# Patient Record
Sex: MALE | Race: WHITE | NOT HISPANIC OR LATINO | Employment: OTHER | URBAN - METROPOLITAN AREA
[De-identification: names, ages, dates, MRNs, and addresses within clinical notes are randomized per-mention and may not be internally consistent; named-entity substitution may affect disease eponyms.]

---

## 2023-01-01 ENCOUNTER — MEDICAL CORRESPONDENCE (OUTPATIENT)
Dept: HEALTH INFORMATION MANAGEMENT | Facility: CLINIC | Age: 73
End: 2023-01-01

## 2024-01-01 ENCOUNTER — TELEPHONE (OUTPATIENT)
Dept: NEUROSURGERY | Facility: CLINIC | Age: 74
End: 2024-01-01
Payer: OTHER MISCELLANEOUS

## 2024-01-01 ENCOUNTER — APPOINTMENT (OUTPATIENT)
Dept: OCCUPATIONAL THERAPY | Facility: HOSPITAL | Age: 74
DRG: 551 | End: 2024-01-01
Payer: COMMERCIAL

## 2024-01-01 ENCOUNTER — APPOINTMENT (OUTPATIENT)
Dept: CT IMAGING | Facility: HOSPITAL | Age: 74
DRG: 551 | End: 2024-01-01
Attending: EMERGENCY MEDICINE
Payer: COMMERCIAL

## 2024-01-01 ENCOUNTER — APPOINTMENT (OUTPATIENT)
Dept: INTERVENTIONAL RADIOLOGY/VASCULAR | Facility: HOSPITAL | Age: 74
DRG: 551 | End: 2024-01-01
Attending: NURSE PRACTITIONER
Payer: COMMERCIAL

## 2024-01-01 ENCOUNTER — HOSPITAL ENCOUNTER (INPATIENT)
Facility: HOSPITAL | Age: 74
LOS: 3 days | Discharge: HOSPICE | DRG: 551 | End: 2024-01-19
Attending: EMERGENCY MEDICINE | Admitting: FAMILY MEDICINE
Payer: COMMERCIAL

## 2024-01-01 ENCOUNTER — APPOINTMENT (OUTPATIENT)
Dept: PHYSICAL THERAPY | Facility: HOSPITAL | Age: 74
DRG: 551 | End: 2024-01-01
Payer: COMMERCIAL

## 2024-01-01 ENCOUNTER — HOSPITAL ENCOUNTER (OUTPATIENT)
Facility: HOSPITAL | Age: 74
Setting detail: OBSERVATION
Discharge: HOME | DRG: 551 | End: 2024-01-16
Attending: EMERGENCY MEDICINE | Admitting: RADIOLOGY
Payer: COMMERCIAL

## 2024-01-01 ENCOUNTER — MEDICAL CORRESPONDENCE (OUTPATIENT)
Dept: HEALTH INFORMATION MANAGEMENT | Facility: CLINIC | Age: 74
End: 2024-01-01
Payer: OTHER MISCELLANEOUS

## 2024-01-01 ENCOUNTER — APPOINTMENT (OUTPATIENT)
Dept: CT IMAGING | Facility: HOSPITAL | Age: 74
DRG: 551 | End: 2024-01-01
Payer: COMMERCIAL

## 2024-01-01 ENCOUNTER — APPOINTMENT (OUTPATIENT)
Dept: CT IMAGING | Facility: HOSPITAL | Age: 74
DRG: 551 | End: 2024-01-01
Attending: NURSE PRACTITIONER
Payer: COMMERCIAL

## 2024-01-01 VITALS
TEMPERATURE: 97.9 F | HEART RATE: 93 BPM | HEIGHT: 69 IN | RESPIRATION RATE: 19 BRPM | SYSTOLIC BLOOD PRESSURE: 135 MMHG | DIASTOLIC BLOOD PRESSURE: 83 MMHG | OXYGEN SATURATION: 98 % | BODY MASS INDEX: 20.38 KG/M2 | WEIGHT: 137.57 LBS

## 2024-01-01 VITALS
TEMPERATURE: 97.4 F | SYSTOLIC BLOOD PRESSURE: 135 MMHG | OXYGEN SATURATION: 100 % | HEART RATE: 80 BPM | RESPIRATION RATE: 18 BRPM | DIASTOLIC BLOOD PRESSURE: 81 MMHG

## 2024-01-01 DIAGNOSIS — S32.059A CLOSED FRACTURE OF FIFTH LUMBAR VERTEBRA, UNSPECIFIED FRACTURE MORPHOLOGY, INITIAL ENCOUNTER (H): ICD-10-CM

## 2024-01-01 DIAGNOSIS — I48.91 ATRIAL FIBRILLATION WITH RAPID VENTRICULAR RESPONSE (H): ICD-10-CM

## 2024-01-01 DIAGNOSIS — Z51.5 HOSPICE CARE PATIENT: ICD-10-CM

## 2024-01-01 DIAGNOSIS — S06.5XAA SDH (SUBDURAL HEMATOMA) (H): Primary | ICD-10-CM
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
## 2024-01-01 DIAGNOSIS — N30.00 ACUTE CYSTITIS WITHOUT HEMATURIA: ICD-10-CM

## 2024-01-01 DIAGNOSIS — S09.90XA TRAUMATIC INJURY OF HEAD, INITIAL ENCOUNTER: ICD-10-CM

## 2024-01-01 DIAGNOSIS — Z86.59 HISTORY OF DEMENTIA: ICD-10-CM
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
## 2024-01-01 DIAGNOSIS — S01.01XA SCALP LACERATION, INITIAL ENCOUNTER: ICD-10-CM
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
## 2024-01-01 DIAGNOSIS — S06.5XAA SUBDURAL HEMATOMA (H): Primary | ICD-10-CM

## 2024-01-01 DIAGNOSIS — I26.99 PULMONARY EMBOLISM, UNSPECIFIED CHRONICITY, UNSPECIFIED PULMONARY EMBOLISM TYPE, UNSPECIFIED WHETHER ACUTE COR PULMONALE PRESENT (H): ICD-10-CM

## 2024-01-01 DIAGNOSIS — S06.5XAA SUBDURAL HEMATOMA (H): ICD-10-CM

## 2024-01-01 LAB
ALBUMIN SERPL BCG-MCNC: 3.2 G/DL (ref 3.5–5.2)
ALBUMIN UR-MCNC: 20 MG/DL
ALBUMIN UR-MCNC: NEGATIVE MG/DL
ALP SERPL-CCNC: 66 U/L (ref 40–150)
ALT SERPL W P-5'-P-CCNC: 15 U/L (ref 0–70)
ANION GAP SERPL CALCULATED.3IONS-SCNC: 10 MMOL/L (ref 7–15)
ANION GAP SERPL CALCULATED.3IONS-SCNC: 10 MMOL/L (ref 7–15)
ANION GAP SERPL CALCULATED.3IONS-SCNC: 11 MMOL/L (ref 7–15)
ANION GAP SERPL CALCULATED.3IONS-SCNC: 4 MMOL/L (ref 7–15)
ANION GAP SERPL CALCULATED.3IONS-SCNC: 7 MMOL/L (ref 7–15)
ANION GAP SERPL CALCULATED.3IONS-SCNC: 8 MMOL/L (ref 7–15)
ANION GAP SERPL CALCULATED.3IONS-SCNC: 8 MMOL/L (ref 7–15)
ANION GAP SERPL CALCULATED.3IONS-SCNC: 9 MMOL/L (ref 7–15)
APPEARANCE UR: ABNORMAL
APPEARANCE UR: ABNORMAL
APTT PPP: 31 SECONDS (ref 22–38)
AST SERPL W P-5'-P-CCNC: 14 U/L (ref 0–45)
ATRIAL RATE - MUSE: 129 BPM
BACTERIA #/AREA URNS HPF: ABNORMAL /HPF
BACTERIA #/AREA URNS HPF: ABNORMAL /HPF
BACTERIA UR CULT: ABNORMAL
BASOPHILS # BLD AUTO: 0 10E3/UL (ref 0–0.2)
BASOPHILS NFR BLD AUTO: 0 %
BILIRUB SERPL-MCNC: 2.1 MG/DL
BILIRUB UR QL STRIP: NEGATIVE
BILIRUB UR QL STRIP: NEGATIVE
BUN SERPL-MCNC: 11.5 MG/DL (ref 8–23)
BUN SERPL-MCNC: 15.5 MG/DL (ref 8–23)
BUN SERPL-MCNC: 15.9 MG/DL (ref 8–23)
BUN SERPL-MCNC: 16.2 MG/DL (ref 8–23)
BUN SERPL-MCNC: 16.8 MG/DL (ref 8–23)
BUN SERPL-MCNC: 17.6 MG/DL (ref 8–23)
BUN SERPL-MCNC: 19.1 MG/DL (ref 8–23)
BUN SERPL-MCNC: 19.9 MG/DL (ref 8–23)
BUN SERPL-MCNC: 25.3 MG/DL (ref 8–23)
BUN SERPL-MCNC: 37.6 MG/DL (ref 8–23)
CALCIUM SERPL-MCNC: 8.5 MG/DL (ref 8.8–10.2)
CALCIUM SERPL-MCNC: 8.6 MG/DL (ref 8.8–10.2)
CALCIUM SERPL-MCNC: 8.6 MG/DL (ref 8.8–10.2)
CALCIUM SERPL-MCNC: 8.7 MG/DL (ref 8.8–10.2)
CALCIUM SERPL-MCNC: 8.9 MG/DL (ref 8.8–10.2)
CALCIUM SERPL-MCNC: 9 MG/DL (ref 8.8–10.2)
CALCIUM SERPL-MCNC: 9.2 MG/DL (ref 8.8–10.2)
CALCIUM SERPL-MCNC: 9.2 MG/DL (ref 8.8–10.2)
CALCIUM SERPL-MCNC: 9.3 MG/DL (ref 8.8–10.2)
CALCIUM SERPL-MCNC: 9.3 MG/DL (ref 8.8–10.2)
CHLORIDE SERPL-SCNC: 101 MMOL/L (ref 98–107)
CHLORIDE SERPL-SCNC: 103 MMOL/L (ref 98–107)
CHLORIDE SERPL-SCNC: 107 MMOL/L (ref 98–107)
CHLORIDE SERPL-SCNC: 109 MMOL/L (ref 98–107)
CHLORIDE SERPL-SCNC: 109 MMOL/L (ref 98–107)
CHLORIDE SERPL-SCNC: 111 MMOL/L (ref 98–107)
CHLORIDE SERPL-SCNC: 111 MMOL/L (ref 98–107)
CK SERPL-CCNC: 26 U/L (ref 39–308)
COLOR UR AUTO: YELLOW
COLOR UR AUTO: YELLOW
CREAT SERPL-MCNC: 0.63 MG/DL (ref 0.67–1.17)
CREAT SERPL-MCNC: 0.69 MG/DL (ref 0.67–1.17)
CREAT SERPL-MCNC: 0.7 MG/DL (ref 0.67–1.17)
CREAT SERPL-MCNC: 0.75 MG/DL (ref 0.67–1.17)
CREAT SERPL-MCNC: 0.84 MG/DL (ref 0.67–1.17)
CREAT SERPL-MCNC: 0.86 MG/DL (ref 0.67–1.17)
CREAT SERPL-MCNC: 0.9 MG/DL (ref 0.67–1.17)
CREAT SERPL-MCNC: 0.92 MG/DL (ref 0.67–1.17)
CREAT SERPL-MCNC: 0.92 MG/DL (ref 0.67–1.17)
CREAT SERPL-MCNC: 0.98 MG/DL (ref 0.67–1.17)
D DIMER PPP FEU-MCNC: 3.45 UG/ML FEU (ref 0–0.5)
DEPRECATED HCO3 PLAS-SCNC: 23 MMOL/L (ref 22–29)
DEPRECATED HCO3 PLAS-SCNC: 24 MMOL/L (ref 22–29)
DEPRECATED HCO3 PLAS-SCNC: 25 MMOL/L (ref 22–29)
DEPRECATED HCO3 PLAS-SCNC: 26 MMOL/L (ref 22–29)
DEPRECATED HCO3 PLAS-SCNC: 27 MMOL/L (ref 22–29)
DEPRECATED HCO3 PLAS-SCNC: 28 MMOL/L (ref 22–29)
DIASTOLIC BLOOD PRESSURE - MUSE: NORMAL MMHG
DIGOXIN SERPL-MCNC: 0.7 NG/ML (ref 0.6–2)
DIGOXIN SERPL-MCNC: <0.4 NG/ML (ref 0.6–2)
EGFRCR SERPLBLD CKD-EPI 2021: 80 ML/MIN/1.73M2
EGFRCR SERPLBLD CKD-EPI 2021: 87 ML/MIN/1.73M2
EGFRCR SERPLBLD CKD-EPI 2021: 87 ML/MIN/1.73M2
EGFRCR SERPLBLD CKD-EPI 2021: 89 ML/MIN/1.73M2
EGFRCR SERPLBLD CKD-EPI 2021: 90 ML/MIN/1.73M2
EGFRCR SERPLBLD CKD-EPI 2021: >90 ML/MIN/1.73M2
EOSINOPHIL # BLD AUTO: 0 10E3/UL (ref 0–0.7)
EOSINOPHIL NFR BLD AUTO: 0 %
ERYTHROCYTE [DISTWIDTH] IN BLOOD BY AUTOMATED COUNT: 15 % (ref 10–15)
ERYTHROCYTE [DISTWIDTH] IN BLOOD BY AUTOMATED COUNT: 15 % (ref 10–15)
ERYTHROCYTE [DISTWIDTH] IN BLOOD BY AUTOMATED COUNT: 15.1 % (ref 10–15)
ERYTHROCYTE [DISTWIDTH] IN BLOOD BY AUTOMATED COUNT: 15.2 % (ref 10–15)
ERYTHROCYTE [DISTWIDTH] IN BLOOD BY AUTOMATED COUNT: 15.3 % (ref 10–15)
ERYTHROCYTE [DISTWIDTH] IN BLOOD BY AUTOMATED COUNT: 15.4 % (ref 10–15)
ERYTHROCYTE [DISTWIDTH] IN BLOOD BY AUTOMATED COUNT: 15.5 % (ref 10–15)
ERYTHROCYTE [DISTWIDTH] IN BLOOD BY AUTOMATED COUNT: 15.6 % (ref 10–15)
GLUCOSE BLDC GLUCOMTR-MCNC: 104 MG/DL (ref 70–99)
GLUCOSE BLDC GLUCOMTR-MCNC: 150 MG/DL (ref 70–99)
GLUCOSE BLDC GLUCOMTR-MCNC: 89 MG/DL (ref 70–99)
GLUCOSE SERPL-MCNC: 105 MG/DL (ref 70–99)
GLUCOSE SERPL-MCNC: 105 MG/DL (ref 70–99)
GLUCOSE SERPL-MCNC: 121 MG/DL (ref 70–99)
GLUCOSE SERPL-MCNC: 121 MG/DL (ref 70–99)
GLUCOSE SERPL-MCNC: 161 MG/DL (ref 70–99)
GLUCOSE SERPL-MCNC: 85 MG/DL (ref 70–99)
GLUCOSE SERPL-MCNC: 90 MG/DL (ref 70–99)
GLUCOSE SERPL-MCNC: 93 MG/DL (ref 70–99)
GLUCOSE SERPL-MCNC: 94 MG/DL (ref 70–99)
GLUCOSE SERPL-MCNC: 97 MG/DL (ref 70–99)
GLUCOSE UR STRIP-MCNC: NEGATIVE MG/DL
GLUCOSE UR STRIP-MCNC: NEGATIVE MG/DL
HCT VFR BLD AUTO: 39.4 % (ref 40–53)
HCT VFR BLD AUTO: 39.7 % (ref 40–53)
HCT VFR BLD AUTO: 40.1 % (ref 40–53)
HCT VFR BLD AUTO: 40.4 % (ref 40–53)
HCT VFR BLD AUTO: 41.3 % (ref 40–53)
HCT VFR BLD AUTO: 41.7 % (ref 40–53)
HCT VFR BLD AUTO: 41.7 % (ref 40–53)
HCT VFR BLD AUTO: 42.8 % (ref 40–53)
HCT VFR BLD AUTO: 44.8 % (ref 40–53)
HCT VFR BLD AUTO: 45.7 % (ref 40–53)
HGB BLD-MCNC: 12.5 G/DL (ref 13.3–17.7)
HGB BLD-MCNC: 13.1 G/DL (ref 13.3–17.7)
HGB BLD-MCNC: 13.1 G/DL (ref 13.3–17.7)
HGB BLD-MCNC: 13.4 G/DL (ref 13.3–17.7)
HGB BLD-MCNC: 13.4 G/DL (ref 13.3–17.7)
HGB BLD-MCNC: 13.7 G/DL (ref 13.3–17.7)
HGB BLD-MCNC: 14.4 G/DL (ref 13.3–17.7)
HGB BLD-MCNC: 14.6 G/DL (ref 13.3–17.7)
HGB UR QL STRIP: NEGATIVE
HGB UR QL STRIP: NEGATIVE
HOLD SPECIMEN: NORMAL
IMM GRANULOCYTES # BLD: 0 10E3/UL
IMM GRANULOCYTES NFR BLD: 0 %
INR PPP: 1.06 (ref 0.85–1.15)
INR PPP: 1.62 (ref 0.85–1.15)
INTERPRETATION ECG - MUSE: NORMAL
KETONES UR STRIP-MCNC: ABNORMAL MG/DL
KETONES UR STRIP-MCNC: NEGATIVE MG/DL
LEUKOCYTE ESTERASE UR QL STRIP: ABNORMAL
LEUKOCYTE ESTERASE UR QL STRIP: ABNORMAL
LYMPHOCYTES # BLD AUTO: 1 10E3/UL (ref 0.8–5.3)
LYMPHOCYTES NFR BLD AUTO: 10 %
MAGNESIUM SERPL-MCNC: 1.9 MG/DL (ref 1.7–2.3)
MCH RBC QN AUTO: 31.3 PG (ref 26.5–33)
MCH RBC QN AUTO: 31.4 PG (ref 26.5–33)
MCH RBC QN AUTO: 31.4 PG (ref 26.5–33)
MCH RBC QN AUTO: 31.5 PG (ref 26.5–33)
MCH RBC QN AUTO: 31.5 PG (ref 26.5–33)
MCH RBC QN AUTO: 31.6 PG (ref 26.5–33)
MCH RBC QN AUTO: 31.6 PG (ref 26.5–33)
MCH RBC QN AUTO: 31.9 PG (ref 26.5–33)
MCH RBC QN AUTO: 32 PG (ref 26.5–33)
MCH RBC QN AUTO: 32.2 PG (ref 26.5–33)
MCHC RBC AUTO-ENTMCNC: 30.9 G/DL (ref 31.5–36.5)
MCHC RBC AUTO-ENTMCNC: 31.5 G/DL (ref 31.5–36.5)
MCHC RBC AUTO-ENTMCNC: 31.7 G/DL (ref 31.5–36.5)
MCHC RBC AUTO-ENTMCNC: 31.7 G/DL (ref 31.5–36.5)
MCHC RBC AUTO-ENTMCNC: 31.9 G/DL (ref 31.5–36.5)
MCHC RBC AUTO-ENTMCNC: 32 G/DL (ref 31.5–36.5)
MCHC RBC AUTO-ENTMCNC: 32.1 G/DL (ref 31.5–36.5)
MCHC RBC AUTO-ENTMCNC: 32.7 G/DL (ref 31.5–36.5)
MCV RBC AUTO: 100 FL (ref 78–100)
MCV RBC AUTO: 101 FL (ref 78–100)
MCV RBC AUTO: 98 FL (ref 78–100)
MCV RBC AUTO: 98 FL (ref 78–100)
MCV RBC AUTO: 99 FL (ref 78–100)
MONOCYTES # BLD AUTO: 0.7 10E3/UL (ref 0–1.3)
MONOCYTES NFR BLD AUTO: 6 %
MUCOUS THREADS #/AREA URNS LPF: PRESENT /LPF
NEUTROPHILS # BLD AUTO: 8.7 10E3/UL (ref 1.6–8.3)
NEUTROPHILS NFR BLD AUTO: 84 %
NITRATE UR QL: POSITIVE
NITRATE UR QL: POSITIVE
NRBC # BLD AUTO: 0 10E3/UL
NRBC BLD AUTO-RTO: 0 /100
NT-PROBNP SERPL-MCNC: 2010 PG/ML (ref 0–900)
P AXIS - MUSE: NORMAL DEGREES
PH UR STRIP: 5.5 [PH] (ref 5–7)
PH UR STRIP: 6 [PH] (ref 5–7)
PLATELET # BLD AUTO: 149 10E3/UL (ref 150–450)
PLATELET # BLD AUTO: 174 10E3/UL (ref 150–450)
PLATELET # BLD AUTO: 174 10E3/UL (ref 150–450)
PLATELET # BLD AUTO: 182 10E3/UL (ref 150–450)
PLATELET # BLD AUTO: 193 10E3/UL (ref 150–450)
PLATELET # BLD AUTO: 194 10E3/UL (ref 150–450)
PLATELET # BLD AUTO: 194 10E3/UL (ref 150–450)
PLATELET # BLD AUTO: 201 10E3/UL (ref 150–450)
PLATELET # BLD AUTO: 203 10E3/UL (ref 150–450)
PLATELET # BLD AUTO: 232 10E3/UL (ref 150–450)
POTASSIUM SERPL-SCNC: 3.8 MMOL/L (ref 3.4–5.3)
POTASSIUM SERPL-SCNC: 3.9 MMOL/L (ref 3.4–5.3)
POTASSIUM SERPL-SCNC: 4.1 MMOL/L (ref 3.4–5.3)
POTASSIUM SERPL-SCNC: 4.2 MMOL/L (ref 3.4–5.3)
POTASSIUM SERPL-SCNC: 4.3 MMOL/L (ref 3.4–5.3)
POTASSIUM SERPL-SCNC: 4.3 MMOL/L (ref 3.4–5.3)
POTASSIUM SERPL-SCNC: 4.4 MMOL/L (ref 3.4–5.3)
PR INTERVAL - MUSE: NORMAL MS
PROT SERPL-MCNC: 6.3 G/DL (ref 6.4–8.3)
QRS DURATION - MUSE: 84 MS
QT - MUSE: 348 MS
QTC - MUSE: 444 MS
R AXIS - MUSE: 2 DEGREES
RADIOLOGIST FLAGS: ABNORMAL
RBC # BLD AUTO: 3.96 10E6/UL (ref 4.4–5.9)
RBC # BLD AUTO: 3.96 10E6/UL (ref 4.4–5.9)
RBC # BLD AUTO: 4 10E6/UL (ref 4.4–5.9)
RBC # BLD AUTO: 4.07 10E6/UL (ref 4.4–5.9)
RBC # BLD AUTO: 4.17 10E6/UL (ref 4.4–5.9)
RBC # BLD AUTO: 4.2 10E6/UL (ref 4.4–5.9)
RBC # BLD AUTO: 4.25 10E6/UL (ref 4.4–5.9)
RBC # BLD AUTO: 4.36 10E6/UL (ref 4.4–5.9)
RBC # BLD AUTO: 4.5 10E6/UL (ref 4.4–5.9)
RBC # BLD AUTO: 4.64 10E6/UL (ref 4.4–5.9)
RBC URINE: 3 /HPF
RBC URINE: 5 /HPF
SODIUM SERPL-SCNC: 137 MMOL/L (ref 135–145)
SODIUM SERPL-SCNC: 137 MMOL/L (ref 135–145)
SODIUM SERPL-SCNC: 139 MMOL/L (ref 135–145)
SODIUM SERPL-SCNC: 140 MMOL/L (ref 135–145)
SODIUM SERPL-SCNC: 140 MMOL/L (ref 135–145)
SODIUM SERPL-SCNC: 141 MMOL/L (ref 135–145)
SODIUM SERPL-SCNC: 141 MMOL/L (ref 135–145)
SODIUM SERPL-SCNC: 143 MMOL/L (ref 135–145)
SODIUM SERPL-SCNC: 143 MMOL/L (ref 135–145)
SODIUM SERPL-SCNC: 145 MMOL/L (ref 135–145)
SP GR UR STRIP: 1.02 (ref 1–1.03)
SP GR UR STRIP: 1.02 (ref 1–1.03)
SQUAMOUS EPITHELIAL: <1 /HPF
SQUAMOUS EPITHELIAL: <1 /HPF
SYSTOLIC BLOOD PRESSURE - MUSE: NORMAL MMHG
T AXIS - MUSE: -61 DEGREES
TROPONIN T SERPL HS-MCNC: 21 NG/L
TROPONIN T SERPL HS-MCNC: 24 NG/L
UROBILINOGEN UR STRIP-MCNC: <2 MG/DL
UROBILINOGEN UR STRIP-MCNC: <2 MG/DL
VENTRICULAR RATE- MUSE: 98 BPM
WBC # BLD AUTO: 10.4 10E3/UL (ref 4–11)
WBC # BLD AUTO: 4.8 10E3/UL (ref 4–11)
WBC # BLD AUTO: 5.7 10E3/UL (ref 4–11)
WBC # BLD AUTO: 6 10E3/UL (ref 4–11)
WBC # BLD AUTO: 6.1 10E3/UL (ref 4–11)
WBC # BLD AUTO: 6.4 10E3/UL (ref 4–11)
WBC # BLD AUTO: 6.6 10E3/UL (ref 4–11)
WBC # BLD AUTO: 6.8 10E3/UL (ref 4–11)
WBC # BLD AUTO: 7.6 10E3/UL (ref 4–11)
WBC # BLD AUTO: 8.8 10E3/UL (ref 4–11)
WBC URINE: 53 /HPF
WBC URINE: 98 /HPF

## 2024-01-01 PROCEDURE — 250N000011 HC RX IP 250 OP 636: Performed by: EMERGENCY MEDICINE

## 2024-01-01 PROCEDURE — 250N000013 HC RX MED GY IP 250 OP 250 PS 637

## 2024-01-01 PROCEDURE — 80048 BASIC METABOLIC PNL TOTAL CA: CPT

## 2024-01-01 PROCEDURE — 250N000013 HC RX MED GY IP 250 OP 250 PS 637: Performed by: STUDENT IN AN ORGANIZED HEALTH CARE EDUCATION/TRAINING PROGRAM

## 2024-01-01 PROCEDURE — 250N000011 HC RX IP 250 OP 636: Performed by: STUDENT IN AN ORGANIZED HEALTH CARE EDUCATION/TRAINING PROGRAM

## 2024-01-01 PROCEDURE — 120N000001 HC R&B MED SURG/OB

## 2024-01-01 PROCEDURE — 36415 COLL VENOUS BLD VENIPUNCTURE: CPT | Performed by: EMERGENCY MEDICINE

## 2024-01-01 PROCEDURE — G0378 HOSPITAL OBSERVATION PER HR: HCPCS

## 2024-01-01 PROCEDURE — 85027 COMPLETE CBC AUTOMATED: CPT

## 2024-01-01 PROCEDURE — 99222 1ST HOSP IP/OBS MODERATE 55: CPT | Mod: AI

## 2024-01-01 PROCEDURE — 85610 PROTHROMBIN TIME: CPT | Performed by: EMERGENCY MEDICINE

## 2024-01-01 PROCEDURE — 87186 SC STD MICRODIL/AGAR DIL: CPT

## 2024-01-01 PROCEDURE — 87086 URINE CULTURE/COLONY COUNT: CPT | Performed by: EMERGENCY MEDICINE

## 2024-01-01 PROCEDURE — 82550 ASSAY OF CK (CPK): CPT | Performed by: EMERGENCY MEDICINE

## 2024-01-01 PROCEDURE — 99232 SBSQ HOSP IP/OBS MODERATE 35: CPT | Mod: GC

## 2024-01-01 PROCEDURE — 71275 CT ANGIOGRAPHY CHEST: CPT

## 2024-01-01 PROCEDURE — 72125 CT NECK SPINE W/O DYE: CPT

## 2024-01-01 PROCEDURE — 84484 ASSAY OF TROPONIN QUANT: CPT | Performed by: EMERGENCY MEDICINE

## 2024-01-01 PROCEDURE — 255N000002 HC RX 255 OP 636: Performed by: RADIOLOGY

## 2024-01-01 PROCEDURE — 06H03DZ INSERTION OF INTRALUMINAL DEVICE INTO INFERIOR VENA CAVA, PERCUTANEOUS APPROACH: ICD-10-PCS | Performed by: RADIOLOGY

## 2024-01-01 PROCEDURE — 96360 HYDRATION IV INFUSION INIT: CPT | Mod: 59

## 2024-01-01 PROCEDURE — 36415 COLL VENOUS BLD VENIPUNCTURE: CPT

## 2024-01-01 PROCEDURE — 70450 CT HEAD/BRAIN W/O DYE: CPT

## 2024-01-01 PROCEDURE — 82962 GLUCOSE BLOOD TEST: CPT

## 2024-01-01 PROCEDURE — 90662 IIV NO PRSV INCREASED AG IM: CPT | Performed by: STUDENT IN AN ORGANIZED HEALTH CARE EDUCATION/TRAINING PROGRAM

## 2024-01-01 PROCEDURE — 83735 ASSAY OF MAGNESIUM: CPT | Performed by: EMERGENCY MEDICINE

## 2024-01-01 PROCEDURE — 80162 ASSAY OF DIGOXIN TOTAL: CPT

## 2024-01-01 PROCEDURE — 83880 ASSAY OF NATRIURETIC PEPTIDE: CPT

## 2024-01-01 PROCEDURE — 250N000009 HC RX 250: Performed by: NURSE PRACTITIONER

## 2024-01-01 PROCEDURE — 85730 THROMBOPLASTIN TIME PARTIAL: CPT | Performed by: EMERGENCY MEDICINE

## 2024-01-01 PROCEDURE — 72131 CT LUMBAR SPINE W/O DYE: CPT

## 2024-01-01 PROCEDURE — 81003 URINALYSIS AUTO W/O SCOPE: CPT | Performed by: EMERGENCY MEDICINE

## 2024-01-01 PROCEDURE — 72128 CT CHEST SPINE W/O DYE: CPT

## 2024-01-01 PROCEDURE — 97162 PT EVAL MOD COMPLEX 30 MIN: CPT | Mod: GP

## 2024-01-01 PROCEDURE — 99223 1ST HOSP IP/OBS HIGH 75: CPT | Mod: AI | Performed by: STUDENT IN AN ORGANIZED HEALTH CARE EDUCATION/TRAINING PROGRAM

## 2024-01-01 PROCEDURE — C1887 CATHETER, GUIDING: HCPCS

## 2024-01-01 PROCEDURE — 99238 HOSP IP/OBS DSCHRG MGMT 30/<: CPT | Mod: GC

## 2024-01-01 PROCEDURE — 80053 COMPREHEN METABOLIC PANEL: CPT | Performed by: EMERGENCY MEDICINE

## 2024-01-01 PROCEDURE — C1880 VENA CAVA FILTER: HCPCS

## 2024-01-01 PROCEDURE — 250N000011 HC RX IP 250 OP 636

## 2024-01-01 PROCEDURE — 93005 ELECTROCARDIOGRAM TRACING: CPT | Performed by: EMERGENCY MEDICINE

## 2024-01-01 PROCEDURE — 96372 THER/PROPH/DIAG INJ SC/IM: CPT

## 2024-01-01 PROCEDURE — 97166 OT EVAL MOD COMPLEX 45 MIN: CPT | Mod: GO

## 2024-01-01 PROCEDURE — 96366 THER/PROPH/DIAG IV INF ADDON: CPT

## 2024-01-01 PROCEDURE — 80048 BASIC METABOLIC PNL TOTAL CA: CPT | Performed by: EMERGENCY MEDICINE

## 2024-01-01 PROCEDURE — 272N000566 HC SHEATH CR3

## 2024-01-01 PROCEDURE — 258N000003 HC RX IP 258 OP 636

## 2024-01-01 PROCEDURE — 99285 EMERGENCY DEPT VISIT HI MDM: CPT | Mod: 25

## 2024-01-01 PROCEDURE — 85025 COMPLETE CBC W/AUTO DIFF WBC: CPT | Performed by: EMERGENCY MEDICINE

## 2024-01-01 PROCEDURE — 87086 URINE CULTURE/COLONY COUNT: CPT

## 2024-01-01 PROCEDURE — 272N000500 HC NEEDLE CR2

## 2024-01-01 PROCEDURE — C1769 GUIDE WIRE: HCPCS

## 2024-01-01 PROCEDURE — 97110 THERAPEUTIC EXERCISES: CPT | Mod: GP

## 2024-01-01 PROCEDURE — 81001 URINALYSIS AUTO W/SCOPE: CPT

## 2024-01-01 PROCEDURE — 250N000013 HC RX MED GY IP 250 OP 250 PS 637: Performed by: EMERGENCY MEDICINE

## 2024-01-01 PROCEDURE — 96365 THER/PROPH/DIAG IV INF INIT: CPT

## 2024-01-01 PROCEDURE — 99223 1ST HOSP IP/OBS HIGH 75: CPT | Performed by: NURSE PRACTITIONER

## 2024-01-01 PROCEDURE — 97535 SELF CARE MNGMENT TRAINING: CPT | Mod: GO

## 2024-01-01 PROCEDURE — 70450 CT HEAD/BRAIN W/O DYE: CPT | Mod: 77

## 2024-01-01 PROCEDURE — 85014 HEMATOCRIT: CPT | Performed by: EMERGENCY MEDICINE

## 2024-01-01 PROCEDURE — 85379 FIBRIN DEGRADATION QUANT: CPT | Performed by: EMERGENCY MEDICINE

## 2024-01-01 PROCEDURE — 37191 INS ENDOVAS VENA CAVA FILTR: CPT

## 2024-01-01 PROCEDURE — G0008 ADMIN INFLUENZA VIRUS VAC: HCPCS | Performed by: STUDENT IN AN ORGANIZED HEALTH CARE EDUCATION/TRAINING PROGRAM

## 2024-01-01 RX ORDER — ONDANSETRON 4 MG/1
4 TABLET, ORALLY DISINTEGRATING ORAL EVERY 8 HOURS PRN
Qty: 10 TABLET | Refills: 0 | Status: SHIPPED | OUTPATIENT
Start: 2024-01-01

## 2024-01-01 RX ORDER — ACETAMINOPHEN 325 MG/1
975 TABLET ORAL 3 TIMES DAILY
Status: DISCONTINUED | OUTPATIENT
Start: 2024-01-01 | End: 2024-01-01 | Stop reason: HOSPADM

## 2024-01-01 RX ORDER — CEFTRIAXONE 2 G/1
2 INJECTION, POWDER, FOR SOLUTION INTRAMUSCULAR; INTRAVENOUS ONCE
Status: COMPLETED | OUTPATIENT
Start: 2024-01-01 | End: 2024-01-01

## 2024-01-01 RX ORDER — GABAPENTIN 100 MG/1
100 CAPSULE ORAL 3 TIMES DAILY
Status: DISCONTINUED | OUTPATIENT
Start: 2024-01-01 | End: 2024-01-01 | Stop reason: HOSPADM

## 2024-01-01 RX ORDER — METOPROLOL TARTRATE 25 MG/1
50 TABLET, FILM COATED ORAL 2 TIMES DAILY
Status: DISCONTINUED | OUTPATIENT
Start: 2024-01-01 | End: 2024-01-01

## 2024-01-01 RX ORDER — HYDRALAZINE HYDROCHLORIDE 20 MG/ML
5 INJECTION INTRAMUSCULAR; INTRAVENOUS EVERY 6 HOURS PRN
Status: DISCONTINUED | OUTPATIENT
Start: 2024-01-01 | End: 2024-01-01

## 2024-01-01 RX ORDER — LIDOCAINE 40 MG/G
CREAM TOPICAL
Status: DISCONTINUED | OUTPATIENT
Start: 2024-01-01 | End: 2024-01-01 | Stop reason: HOSPADM

## 2024-01-01 RX ORDER — ONDANSETRON 2 MG/ML
4 INJECTION INTRAMUSCULAR; INTRAVENOUS EVERY 6 HOURS PRN
Status: DISCONTINUED | OUTPATIENT
Start: 2024-01-01 | End: 2024-01-01 | Stop reason: HOSPADM

## 2024-01-01 RX ORDER — LANOLIN ALCOHOL/MO/W.PET/CERES
3 CREAM (GRAM) TOPICAL
COMMUNITY
Start: 2024-01-01

## 2024-01-01 RX ORDER — OLANZAPINE 2.5 MG/1
2.5 TABLET, FILM COATED ORAL 2 TIMES DAILY PRN
Status: DISCONTINUED | OUTPATIENT
Start: 2024-01-01 | End: 2024-01-01 | Stop reason: HOSPADM

## 2024-01-01 RX ORDER — IOPAMIDOL 755 MG/ML
90 INJECTION, SOLUTION INTRAVASCULAR ONCE
Status: COMPLETED | OUTPATIENT
Start: 2024-01-01 | End: 2024-01-01

## 2024-01-01 RX ORDER — LORAZEPAM 0.5 MG/1
0.5 TABLET ORAL EVERY 6 HOURS PRN
Qty: 10 TABLET | Refills: 0 | Status: SHIPPED | OUTPATIENT
Start: 2024-01-01

## 2024-01-01 RX ORDER — AMOXICILLIN 250 MG
2 CAPSULE ORAL 2 TIMES DAILY PRN
Status: DISCONTINUED | OUTPATIENT
Start: 2024-01-01 | End: 2024-01-01

## 2024-01-01 RX ORDER — NALOXONE HYDROCHLORIDE 0.4 MG/ML
0.4 INJECTION, SOLUTION INTRAMUSCULAR; INTRAVENOUS; SUBCUTANEOUS
Status: DISCONTINUED | OUTPATIENT
Start: 2024-01-01 | End: 2024-01-01

## 2024-01-01 RX ORDER — MULTIVITAMIN,THERAPEUTIC
1 TABLET ORAL DAILY
Status: DISCONTINUED | OUTPATIENT
Start: 2024-01-01 | End: 2024-01-01 | Stop reason: HOSPADM

## 2024-01-01 RX ORDER — DIGOXIN 125 MCG
125 TABLET ORAL DAILY
Status: DISCONTINUED | OUTPATIENT
Start: 2024-01-01 | End: 2024-01-01 | Stop reason: HOSPADM

## 2024-01-01 RX ORDER — METOPROLOL TARTRATE 50 MG
50 TABLET ORAL 2 TIMES DAILY
Status: ON HOLD | COMMUNITY
Start: 2024-01-01 | End: 2024-01-01

## 2024-01-01 RX ORDER — POLYETHYLENE GLYCOL 3350 17 G/17G
17 POWDER, FOR SOLUTION ORAL 2 TIMES DAILY PRN
Status: DISCONTINUED | OUTPATIENT
Start: 2024-01-01 | End: 2024-01-01

## 2024-01-01 RX ORDER — LANOLIN ALCOHOL/MO/W.PET/CERES
1000 CREAM (GRAM) TOPICAL DAILY
Status: DISCONTINUED | OUTPATIENT
Start: 2024-01-01 | End: 2024-01-01 | Stop reason: HOSPADM

## 2024-01-01 RX ORDER — MULTIPLE VITAMINS W/ MINERALS TAB 9MG-400MCG
1 TAB ORAL DAILY
Status: DISCONTINUED | OUTPATIENT
Start: 2024-01-01 | End: 2024-01-01 | Stop reason: HOSPADM

## 2024-01-01 RX ORDER — DIGOXIN 125 MCG
250 TABLET ORAL DAILY
Status: DISCONTINUED | OUTPATIENT
Start: 2024-01-01 | End: 2024-01-01

## 2024-01-01 RX ORDER — AMOXICILLIN 250 MG
1 CAPSULE ORAL 2 TIMES DAILY PRN
Status: DISCONTINUED | OUTPATIENT
Start: 2024-01-01 | End: 2024-01-01

## 2024-01-01 RX ORDER — NALOXONE HYDROCHLORIDE 0.4 MG/ML
0.2 INJECTION, SOLUTION INTRAMUSCULAR; INTRAVENOUS; SUBCUTANEOUS
Status: DISCONTINUED | OUTPATIENT
Start: 2024-01-01 | End: 2024-01-01

## 2024-01-01 RX ORDER — SENNA AND DOCUSATE SODIUM 50; 8.6 MG/1; MG/1
1 TABLET, FILM COATED ORAL AT BEDTIME
Qty: 10 TABLET | Refills: 0 | Status: SHIPPED | OUTPATIENT
Start: 2024-01-01

## 2024-01-01 RX ORDER — MULTIVIT-MIN/FA/LYCOPEN/LUTEIN .4-300-25
1 TABLET ORAL DAILY
COMMUNITY

## 2024-01-01 RX ORDER — ONDANSETRON 4 MG/1
4 TABLET, ORALLY DISINTEGRATING ORAL EVERY 6 HOURS PRN
Status: DISCONTINUED | OUTPATIENT
Start: 2024-01-01 | End: 2024-01-01 | Stop reason: HOSPADM

## 2024-01-01 RX ORDER — DIGOXIN 125 MCG
125 TABLET ORAL DAILY
Status: DISCONTINUED | OUTPATIENT
Start: 2024-01-01 | End: 2024-01-01

## 2024-01-01 RX ORDER — ACETAMINOPHEN 325 MG/1
975 TABLET ORAL EVERY 8 HOURS PRN
Qty: 90 TABLET | Refills: 0 | Status: SHIPPED | OUTPATIENT
Start: 2024-01-01

## 2024-01-01 RX ORDER — METOPROLOL TARTRATE 100 MG
100 TABLET ORAL 2 TIMES DAILY
Status: DISCONTINUED | OUTPATIENT
Start: 2024-01-01 | End: 2024-01-01 | Stop reason: HOSPADM

## 2024-01-01 RX ORDER — METOPROLOL TARTRATE 1 MG/ML
5 INJECTION, SOLUTION INTRAVENOUS ONCE
Status: DISCONTINUED | OUTPATIENT
Start: 2024-01-01 | End: 2024-01-01

## 2024-01-01 RX ORDER — TRAZODONE HYDROCHLORIDE 50 MG/1
200 TABLET, FILM COATED ORAL AT BEDTIME
Status: DISCONTINUED | OUTPATIENT
Start: 2024-01-01 | End: 2024-01-01 | Stop reason: HOSPADM

## 2024-01-01 RX ORDER — DIGOXIN 125 MCG
125 TABLET ORAL DAILY
COMMUNITY
Start: 2024-01-01

## 2024-01-01 RX ORDER — GABAPENTIN 100 MG/1
100 CAPSULE ORAL 3 TIMES DAILY
COMMUNITY
Start: 2024-01-01

## 2024-01-01 RX ORDER — AMOXICILLIN 250 MG
1 CAPSULE ORAL 2 TIMES DAILY
Status: DISCONTINUED | OUTPATIENT
Start: 2024-01-01 | End: 2024-01-01 | Stop reason: HOSPADM

## 2024-01-01 RX ORDER — METOPROLOL TARTRATE 100 MG
100 TABLET ORAL 2 TIMES DAILY
Qty: 60 TABLET | Refills: 0 | Status: SHIPPED | OUTPATIENT
Start: 2024-01-01

## 2024-01-01 RX ORDER — DIGOXIN 250 MCG
250 TABLET ORAL DAILY
Qty: 30 TABLET | Refills: 0 | Status: CANCELLED | OUTPATIENT
Start: 2024-01-01

## 2024-01-01 RX ORDER — OLANZAPINE 10 MG/2ML
5 INJECTION, POWDER, FOR SOLUTION INTRAMUSCULAR ONCE
Status: COMPLETED | OUTPATIENT
Start: 2024-01-01 | End: 2024-01-01

## 2024-01-01 RX ORDER — POLYETHYLENE GLYCOL 3350 17 G/17G
17 POWDER, FOR SOLUTION ORAL DAILY
Status: DISCONTINUED | OUTPATIENT
Start: 2024-01-01 | End: 2024-01-01 | Stop reason: HOSPADM

## 2024-01-01 RX ORDER — CALCIUM CARBONATE 500 MG/1
1000 TABLET, CHEWABLE ORAL 4 TIMES DAILY PRN
Status: DISCONTINUED | OUTPATIENT
Start: 2024-01-01 | End: 2024-01-01 | Stop reason: HOSPADM

## 2024-01-01 RX ORDER — FENTANYL CITRATE 50 UG/ML
25-50 INJECTION, SOLUTION INTRAMUSCULAR; INTRAVENOUS EVERY 5 MIN PRN
Status: DISCONTINUED | OUTPATIENT
Start: 2024-01-01 | End: 2024-01-01

## 2024-01-01 RX ORDER — AMOXICILLIN 250 MG
2 CAPSULE ORAL 2 TIMES DAILY
Status: DISCONTINUED | OUTPATIENT
Start: 2024-01-01 | End: 2024-01-01 | Stop reason: HOSPADM

## 2024-01-01 RX ORDER — TRAZODONE HYDROCHLORIDE 100 MG/1
200 TABLET ORAL AT BEDTIME
COMMUNITY
Start: 2024-01-01

## 2024-01-01 RX ORDER — OLANZAPINE 2.5 MG/1
2.5 TABLET, FILM COATED ORAL 2 TIMES DAILY PRN
COMMUNITY
Start: 2024-01-01

## 2024-01-01 RX ORDER — ACETAMINOPHEN 325 MG/1
975 TABLET ORAL ONCE
Status: COMPLETED | OUTPATIENT
Start: 2024-01-01 | End: 2024-01-01

## 2024-01-01 RX ORDER — TRAZODONE HYDROCHLORIDE 100 MG/1
200 TABLET ORAL AT BEDTIME
Status: DISCONTINUED | OUTPATIENT
Start: 2024-01-01 | End: 2024-01-01 | Stop reason: HOSPADM

## 2024-01-01 RX ORDER — TRAZODONE HYDROCHLORIDE 50 MG/1
25 TABLET, FILM COATED ORAL 2 TIMES DAILY PRN
COMMUNITY
Start: 2024-01-01

## 2024-01-01 RX ORDER — LANOLIN ALCOHOL/MO/W.PET/CERES
1000 CREAM (GRAM) TOPICAL DAILY
COMMUNITY

## 2024-01-01 RX ORDER — HYDROMORPHONE HYDROCHLORIDE 2 MG/1
1 TABLET ORAL
Qty: 10 TABLET | Refills: 0 | Status: SHIPPED | OUTPATIENT
Start: 2024-01-01 | End: 2024-01-01

## 2024-01-01 RX ORDER — ACETAMINOPHEN 325 MG/1
975 TABLET ORAL EVERY 8 HOURS
Status: DISCONTINUED | OUTPATIENT
Start: 2024-01-01 | End: 2024-01-01 | Stop reason: HOSPADM

## 2024-01-01 RX ORDER — METOPROLOL TARTRATE 25 MG/1
100 TABLET, FILM COATED ORAL 2 TIMES DAILY
Status: DISCONTINUED | OUTPATIENT
Start: 2024-01-01 | End: 2024-01-01 | Stop reason: HOSPADM

## 2024-01-01 RX ADMIN — TRAZODONE HYDROCHLORIDE 200 MG: 50 TABLET ORAL at 20:35

## 2024-01-01 RX ADMIN — GABAPENTIN 100 MG: 100 CAPSULE ORAL at 13:42

## 2024-01-01 RX ADMIN — GABAPENTIN 100 MG: 100 CAPSULE ORAL at 09:16

## 2024-01-01 RX ADMIN — TRAZODONE HYDROCHLORIDE 200 MG: 50 TABLET ORAL at 21:53

## 2024-01-01 RX ADMIN — GABAPENTIN 100 MG: 100 CAPSULE ORAL at 09:59

## 2024-01-01 RX ADMIN — GABAPENTIN 100 MG: 100 CAPSULE ORAL at 20:16

## 2024-01-01 RX ADMIN — ACETAMINOPHEN 975 MG: 325 TABLET ORAL at 08:15

## 2024-01-01 RX ADMIN — TRAZODONE HYDROCHLORIDE 200 MG: 100 TABLET ORAL at 21:40

## 2024-01-01 RX ADMIN — METOPROLOL TARTRATE 50 MG: 25 TABLET, FILM COATED ORAL at 22:11

## 2024-01-01 RX ADMIN — SENNOSIDES AND DOCUSATE SODIUM 1 TABLET: 8.6; 5 TABLET ORAL at 08:15

## 2024-01-01 RX ADMIN — ACETAMINOPHEN 975 MG: 325 TABLET ORAL at 20:25

## 2024-01-01 RX ADMIN — Medication 1 TABLET: at 10:01

## 2024-01-01 RX ADMIN — LIDOCAINE HYDROCHLORIDE 20 ML: 10 INJECTION, SOLUTION INFILTRATION; PERINEURAL at 15:39

## 2024-01-01 RX ADMIN — GABAPENTIN 100 MG: 100 CAPSULE ORAL at 09:14

## 2024-01-01 RX ADMIN — ACETAMINOPHEN 975 MG: 325 TABLET ORAL at 09:16

## 2024-01-01 RX ADMIN — CYANOCOBALAMIN TAB 1000 MCG 1000 MCG: 1000 TAB at 09:15

## 2024-01-01 RX ADMIN — DIGOXIN 250 MCG: 125 TABLET ORAL at 08:29

## 2024-01-01 RX ADMIN — GABAPENTIN 100 MG: 100 CAPSULE ORAL at 21:40

## 2024-01-01 RX ADMIN — ACETAMINOPHEN 975 MG: 325 TABLET ORAL at 13:12

## 2024-01-01 RX ADMIN — ACETAMINOPHEN 975 MG: 325 TABLET ORAL at 14:15

## 2024-01-01 RX ADMIN — CYANOCOBALAMIN TAB 1000 MCG 1000 MCG: 1000 TAB at 08:15

## 2024-01-01 RX ADMIN — TRAZODONE HYDROCHLORIDE 200 MG: 100 TABLET ORAL at 20:20

## 2024-01-01 RX ADMIN — METOPROLOL TARTRATE 50 MG: 25 TABLET, FILM COATED ORAL at 09:15

## 2024-01-01 RX ADMIN — GABAPENTIN 100 MG: 100 CAPSULE ORAL at 08:17

## 2024-01-01 RX ADMIN — THIAMINE HCL TAB 100 MG 100 MG: 100 TAB at 08:15

## 2024-01-01 RX ADMIN — GABAPENTIN 100 MG: 100 CAPSULE ORAL at 20:30

## 2024-01-01 RX ADMIN — GABAPENTIN 100 MG: 100 CAPSULE ORAL at 14:10

## 2024-01-01 RX ADMIN — CYANOCOBALAMIN TAB 1000 MCG 1000 MCG: 1000 TAB at 08:17

## 2024-01-01 RX ADMIN — GABAPENTIN 100 MG: 100 CAPSULE ORAL at 22:11

## 2024-01-01 RX ADMIN — POLYETHYLENE GLYCOL 3350 17 G: 17 POWDER, FOR SOLUTION ORAL at 09:57

## 2024-01-01 RX ADMIN — Medication 1 TABLET: at 08:29

## 2024-01-01 RX ADMIN — GABAPENTIN 100 MG: 100 CAPSULE ORAL at 12:35

## 2024-01-01 RX ADMIN — ACETAMINOPHEN 975 MG: 325 TABLET ORAL at 15:49

## 2024-01-01 RX ADMIN — OLANZAPINE 5 MG: 10 INJECTION, POWDER, FOR SOLUTION INTRAMUSCULAR at 05:14

## 2024-01-01 RX ADMIN — DIGOXIN 250 MCG: 125 TABLET ORAL at 10:07

## 2024-01-01 RX ADMIN — METOPROLOL TARTRATE 100 MG: 25 TABLET, FILM COATED ORAL at 08:33

## 2024-01-01 RX ADMIN — METOPROLOL TARTRATE 100 MG: 25 TABLET, FILM COATED ORAL at 20:30

## 2024-01-01 RX ADMIN — THERA TABS 1 TABLET: TAB at 08:17

## 2024-01-01 RX ADMIN — CEFTRIAXONE SODIUM 2 G: 2 INJECTION, POWDER, FOR SOLUTION INTRAMUSCULAR; INTRAVENOUS at 22:09

## 2024-01-01 RX ADMIN — METOPROLOL TARTRATE 100 MG: 25 TABLET, FILM COATED ORAL at 00:45

## 2024-01-01 RX ADMIN — ACETAMINOPHEN 975 MG: 325 TABLET ORAL at 00:45

## 2024-01-01 RX ADMIN — SENNOSIDES AND DOCUSATE SODIUM 2 TABLET: 8.6; 5 TABLET ORAL at 20:30

## 2024-01-01 RX ADMIN — DIGOXIN 125 MCG: 125 TABLET ORAL at 08:32

## 2024-01-01 RX ADMIN — TRAZODONE HYDROCHLORIDE 25 MG: 50 TABLET ORAL at 20:16

## 2024-01-01 RX ADMIN — METOPROLOL TARTRATE 50 MG: 25 TABLET, FILM COATED ORAL at 21:39

## 2024-01-01 RX ADMIN — GABAPENTIN 100 MG: 100 CAPSULE ORAL at 20:25

## 2024-01-01 RX ADMIN — THERA TABS 1 TABLET: TAB at 08:15

## 2024-01-01 RX ADMIN — POLYETHYLENE GLYCOL 3350 17 G: 17 POWDER, FOR SOLUTION ORAL at 08:32

## 2024-01-01 RX ADMIN — ACETAMINOPHEN 975 MG: 325 TABLET ORAL at 09:15

## 2024-01-01 RX ADMIN — SENNOSIDES AND DOCUSATE SODIUM 1 TABLET: 8.6; 5 TABLET ORAL at 10:08

## 2024-01-01 RX ADMIN — ACETAMINOPHEN 975 MG: 325 TABLET ORAL at 08:33

## 2024-01-01 RX ADMIN — SENNOSIDES AND DOCUSATE SODIUM 1 TABLET: 8.6; 5 TABLET ORAL at 21:52

## 2024-01-01 RX ADMIN — POLYETHYLENE GLYCOL 3350 17 G: 17 POWDER, FOR SOLUTION ORAL at 08:15

## 2024-01-01 RX ADMIN — SENNOSIDES AND DOCUSATE SODIUM 1 TABLET: 8.6; 5 TABLET ORAL at 20:16

## 2024-01-01 RX ADMIN — Medication 1 TABLET: at 09:16

## 2024-01-01 RX ADMIN — METOPROLOL TARTRATE 100 MG: 25 TABLET, FILM COATED ORAL at 08:13

## 2024-01-01 RX ADMIN — GABAPENTIN 100 MG: 100 CAPSULE ORAL at 13:03

## 2024-01-01 RX ADMIN — METOPROLOL TARTRATE 100 MG: 100 TABLET, FILM COATED ORAL at 20:16

## 2024-01-01 RX ADMIN — THERA TABS 1 TABLET: TAB at 08:32

## 2024-01-01 RX ADMIN — CYANOCOBALAMIN TAB 1000 MCG 1000 MCG: 1000 TAB at 10:01

## 2024-01-01 RX ADMIN — SENNOSIDES AND DOCUSATE SODIUM 1 TABLET: 8.6; 5 TABLET ORAL at 20:24

## 2024-01-01 RX ADMIN — ACETAMINOPHEN 975 MG: 325 TABLET ORAL at 22:08

## 2024-01-01 RX ADMIN — TRAZODONE HYDROCHLORIDE 200 MG: 100 TABLET ORAL at 22:11

## 2024-01-01 RX ADMIN — ACETAMINOPHEN 975 MG: 325 TABLET ORAL at 08:29

## 2024-01-01 RX ADMIN — GABAPENTIN 100 MG: 100 CAPSULE ORAL at 14:15

## 2024-01-01 RX ADMIN — DIGOXIN 125 MCG: 125 TABLET ORAL at 09:41

## 2024-01-01 RX ADMIN — ACETAMINOPHEN 975 MG: 325 TABLET ORAL at 21:39

## 2024-01-01 RX ADMIN — POLYETHYLENE GLYCOL 3350 17 G: 17 POWDER, FOR SOLUTION ORAL at 10:07

## 2024-01-01 RX ADMIN — POLYETHYLENE GLYCOL 3350 17 G: 17 POWDER, FOR SOLUTION ORAL at 09:02

## 2024-01-01 RX ADMIN — TRAZODONE HYDROCHLORIDE 25 MG: 50 TABLET ORAL at 12:56

## 2024-01-01 RX ADMIN — POLYETHYLENE GLYCOL 3350 17 G: 17 POWDER, FOR SOLUTION ORAL at 07:54

## 2024-01-01 RX ADMIN — ACETAMINOPHEN 975 MG: 325 TABLET ORAL at 13:03

## 2024-01-01 RX ADMIN — ACETAMINOPHEN 975 MG: 325 TABLET ORAL at 14:10

## 2024-01-01 RX ADMIN — ACETAMINOPHEN 975 MG: 325 TABLET ORAL at 21:02

## 2024-01-01 RX ADMIN — SENNOSIDES AND DOCUSATE SODIUM 1 TABLET: 8.6; 5 TABLET ORAL at 10:02

## 2024-01-01 RX ADMIN — CYANOCOBALAMIN TAB 1000 MCG 1000 MCG: 1000 TAB at 08:32

## 2024-01-01 RX ADMIN — OLANZAPINE 2.5 MG: 2.5 TABLET, FILM COATED ORAL at 00:05

## 2024-01-01 RX ADMIN — GABAPENTIN 100 MG: 100 CAPSULE ORAL at 13:12

## 2024-01-01 RX ADMIN — Medication 1 TABLET: at 09:15

## 2024-01-01 RX ADMIN — SODIUM CHLORIDE, POTASSIUM CHLORIDE, SODIUM LACTATE AND CALCIUM CHLORIDE 500 ML: 600; 310; 30; 20 INJECTION, SOLUTION INTRAVENOUS at 21:46

## 2024-01-01 RX ADMIN — DIGOXIN 125 MCG: 125 TABLET ORAL at 08:15

## 2024-01-01 RX ADMIN — ACETAMINOPHEN 975 MG: 325 TABLET ORAL at 09:02

## 2024-01-01 RX ADMIN — GABAPENTIN 100 MG: 100 CAPSULE ORAL at 08:15

## 2024-01-01 RX ADMIN — GABAPENTIN 100 MG: 100 CAPSULE ORAL at 21:52

## 2024-01-01 RX ADMIN — SENNOSIDES AND DOCUSATE SODIUM 1 TABLET: 8.6; 5 TABLET ORAL at 09:14

## 2024-01-01 RX ADMIN — TRAZODONE HYDROCHLORIDE 200 MG: 100 TABLET ORAL at 20:25

## 2024-01-01 RX ADMIN — METOPROLOL TARTRATE 100 MG: 100 TABLET, FILM COATED ORAL at 20:25

## 2024-01-01 RX ADMIN — OLANZAPINE 2.5 MG: 2.5 TABLET, FILM COATED ORAL at 12:35

## 2024-01-01 RX ADMIN — DIGOXIN 125 MCG: 125 TABLET ORAL at 09:15

## 2024-01-01 RX ADMIN — IOPAMIDOL 90 ML: 755 INJECTION, SOLUTION INTRAVENOUS at 16:53

## 2024-01-01 RX ADMIN — CYANOCOBALAMIN TAB 1000 MCG 1000 MCG: 1000 TAB at 09:16

## 2024-01-01 RX ADMIN — ACETAMINOPHEN 975 MG: 325 TABLET ORAL at 22:11

## 2024-01-01 RX ADMIN — SENNOSIDES AND DOCUSATE SODIUM 1 TABLET: 8.6; 5 TABLET ORAL at 00:45

## 2024-01-01 RX ADMIN — ACETAMINOPHEN 975 MG: 325 TABLET ORAL at 10:00

## 2024-01-01 RX ADMIN — METOPROLOL TARTRATE 100 MG: 25 TABLET, FILM COATED ORAL at 08:15

## 2024-01-01 RX ADMIN — ACETAMINOPHEN 975 MG: 325 TABLET ORAL at 00:49

## 2024-01-01 RX ADMIN — GABAPENTIN 100 MG: 100 CAPSULE ORAL at 14:43

## 2024-01-01 RX ADMIN — THIAMINE HCL TAB 100 MG 100 MG: 100 TAB at 08:16

## 2024-01-01 RX ADMIN — METOPROLOL TARTRATE 100 MG: 100 TABLET, FILM COATED ORAL at 09:42

## 2024-01-01 RX ADMIN — THIAMINE HCL TAB 100 MG 100 MG: 100 TAB at 08:32

## 2024-01-01 RX ADMIN — ACETAMINOPHEN 975 MG: 325 TABLET ORAL at 16:09

## 2024-01-01 RX ADMIN — DIGOXIN 250 MCG: 125 TABLET ORAL at 09:58

## 2024-01-01 RX ADMIN — GABAPENTIN 100 MG: 100 CAPSULE ORAL at 08:29

## 2024-01-01 RX ADMIN — TRAZODONE HYDROCHLORIDE 200 MG: 100 TABLET ORAL at 21:02

## 2024-01-01 RX ADMIN — OLANZAPINE 2.5 MG: 2.5 TABLET, FILM COATED ORAL at 00:44

## 2024-01-01 RX ADMIN — DIGOXIN 125 MCG: 125 TABLET ORAL at 08:57

## 2024-01-01 RX ADMIN — TRAZODONE HYDROCHLORIDE 200 MG: 50 TABLET ORAL at 00:44

## 2024-01-01 RX ADMIN — METOPROLOL TARTRATE 100 MG: 100 TABLET, FILM COATED ORAL at 09:59

## 2024-01-01 RX ADMIN — IOHEXOL 70 ML: 350 INJECTION, SOLUTION INTRAVENOUS at 15:55

## 2024-01-01 RX ADMIN — GABAPENTIN 100 MG: 100 CAPSULE ORAL at 21:02

## 2024-01-01 RX ADMIN — ACETAMINOPHEN 975 MG: 325 TABLET ORAL at 20:16

## 2024-01-01 RX ADMIN — METOPROLOL TARTRATE 50 MG: 25 TABLET, FILM COATED ORAL at 21:02

## 2024-01-01 RX ADMIN — SENNOSIDES AND DOCUSATE SODIUM 1 TABLET: 8.6; 5 TABLET ORAL at 10:29

## 2024-01-01 RX ADMIN — METOPROLOL TARTRATE 100 MG: 25 TABLET, FILM COATED ORAL at 21:53

## 2024-01-01 RX ADMIN — Medication 1 TABLET: at 09:02

## 2024-01-01 RX ADMIN — GABAPENTIN 100 MG: 100 CAPSULE ORAL at 08:33

## 2024-01-01 RX ADMIN — INFLUENZA A VIRUS A/VICTORIA/4897/2022 IVR-238 (H1N1) ANTIGEN (FORMALDEHYDE INACTIVATED), INFLUENZA A VIRUS A/DARWIN/9/2021 SAN-010 (H3N2) ANTIGEN (FORMALDEHYDE INACTIVATED), INFLUENZA B VIRUS B/PHUKET/3073/2013 ANTIGEN (FORMALDEHYDE INACTIVATED), AND INFLUENZA B VIRUS B/MICHIGAN/01/2021 ANTIGEN (FORMALDEHYDE INACTIVATED) 0.7 ML: 60; 60; 60; 60 INJECTION, SUSPENSION INTRAMUSCULAR at 10:02

## 2024-01-01 RX ADMIN — CYANOCOBALAMIN TAB 1000 MCG 1000 MCG: 1000 TAB at 08:29

## 2024-01-01 ASSESSMENT — ACTIVITIES OF DAILY LIVING (ADL)
ADLS_ACUITY_SCORE: 50
ADLS_ACUITY_SCORE: 37
ADLS_ACUITY_SCORE: 35
ADLS_ACUITY_SCORE: 44
ADLS_ACUITY_SCORE: 35
ADLS_ACUITY_SCORE: 50
ADLS_ACUITY_SCORE: 35
ADLS_ACUITY_SCORE: 54
ADLS_ACUITY_SCORE: 50
ADLS_ACUITY_SCORE: 54
ADLS_ACUITY_SCORE: 35
ADLS_ACUITY_SCORE: 50
ADLS_ACUITY_SCORE: 44
ADLS_ACUITY_SCORE: 50
ADLS_ACUITY_SCORE: 44
ADLS_ACUITY_SCORE: 35
ADLS_ACUITY_SCORE: 35
ADLS_ACUITY_SCORE: 37
ADLS_ACUITY_SCORE: 50
ADLS_ACUITY_SCORE: 50
ADLS_ACUITY_SCORE: 35
ADLS_ACUITY_SCORE: 39
ADLS_ACUITY_SCORE: 50
ADLS_ACUITY_SCORE: 45
ADLS_ACUITY_SCORE: 50
ADLS_ACUITY_SCORE: 50
ADLS_ACUITY_SCORE: 35
ADLS_ACUITY_SCORE: 41
ADLS_ACUITY_SCORE: 35
ADLS_ACUITY_SCORE: 44
ADLS_ACUITY_SCORE: 50
ADLS_ACUITY_SCORE: 34
ADLS_ACUITY_SCORE: 34
ADLS_ACUITY_SCORE: 39
ADLS_ACUITY_SCORE: 39
ADLS_ACUITY_SCORE: 45
ADLS_ACUITY_SCORE: 50
ADLS_ACUITY_SCORE: 50
ADLS_ACUITY_SCORE: 34
ADLS_ACUITY_SCORE: 45
ADLS_ACUITY_SCORE: 50
ADLS_ACUITY_SCORE: 44
ADLS_ACUITY_SCORE: 44
DEPENDENT_IADLS:: CLEANING;COOKING;LAUNDRY;SHOPPING;MEAL PREPARATION;MEDICATION MANAGEMENT;MONEY MANAGEMENT;TRANSPORTATION;INCONTINENCE
ADLS_ACUITY_SCORE: 35
ADLS_ACUITY_SCORE: 50
ADLS_ACUITY_SCORE: 37
ADLS_ACUITY_SCORE: 37
ADLS_ACUITY_SCORE: 41
ADLS_ACUITY_SCORE: 54
ADLS_ACUITY_SCORE: 44
ADLS_ACUITY_SCORE: 35
ADLS_ACUITY_SCORE: 35
ADLS_ACUITY_SCORE: 50
ADLS_ACUITY_SCORE: 35
ADLS_ACUITY_SCORE: 45
ADLS_ACUITY_SCORE: 35
ADLS_ACUITY_SCORE: 33
ADLS_ACUITY_SCORE: 50
ADLS_ACUITY_SCORE: 35
ADLS_ACUITY_SCORE: 33
ADLS_ACUITY_SCORE: 50
ADLS_ACUITY_SCORE: 50
ADLS_ACUITY_SCORE: 47
DEPENDENT_IADLS:: CLEANING;COOKING;LAUNDRY;SHOPPING;MEAL PREPARATION;MEDICATION MANAGEMENT;TRANSPORTATION;INCONTINENCE
ADLS_ACUITY_SCORE: 39
ADLS_ACUITY_SCORE: 35
ADLS_ACUITY_SCORE: 40
ADLS_ACUITY_SCORE: 44
ADLS_ACUITY_SCORE: 35
ADLS_ACUITY_SCORE: 50
ADLS_ACUITY_SCORE: 41
ADLS_ACUITY_SCORE: 44
ADLS_ACUITY_SCORE: 48
ADLS_ACUITY_SCORE: 50
ADLS_ACUITY_SCORE: 50
ADLS_ACUITY_SCORE: 35
ADLS_ACUITY_SCORE: 35
ADLS_ACUITY_SCORE: 40
ADLS_ACUITY_SCORE: 35
ADLS_ACUITY_SCORE: 50
ADLS_ACUITY_SCORE: 50
ADLS_ACUITY_SCORE: 37
ADLS_ACUITY_SCORE: 50
ADLS_ACUITY_SCORE: 41
ADLS_ACUITY_SCORE: 35
ADLS_ACUITY_SCORE: 39
ADLS_ACUITY_SCORE: 35
ADLS_ACUITY_SCORE: 37
ADLS_ACUITY_SCORE: 31
ADLS_ACUITY_SCORE: 50
ADLS_ACUITY_SCORE: 35

## 2024-01-11 PROBLEM — S06.5XAA SUBDURAL HEMATOMA (H): Status: ACTIVE | Noted: 2024-01-01

## 2024-01-11 PROBLEM — I48.91 ATRIAL FIBRILLATION WITH RAPID VENTRICULAR RESPONSE (H): Status: ACTIVE | Noted: 2024-01-01
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
## 2024-01-11 NOTE — CONSULTS
Neurosurgery Consultation      Date of Service:  01/11/2024  Date of Admission:  1/11/2024  Hospital Day: 1    Assessment/Plan  Kaiden Lowery is a 73 year old male with a unknown past medical history but ED provider reports pt lives in facility, dementia, and on AC for unknown reasons admitted on 1/11/2024 for fall hitting head. NSGY consulted for Left frontoparietal hygroma. No midline shift or mass effect.     Neuro  #subdural hygromas   -Neurochecks every 4 hrs  -SBP goal < 150 mmHg  -HOB > 30   -PT/OT  -Hold Anticoagulation   -Obtain records through care everywhere  -Repeat CT head non con in AM  -Admit     With no outside records or family to get health history it is unclear if patient's agitation and confusion is his baseline or this is an acute finding.  Recommend admitting patient overnight to care on the side of caution.  Would recommend a.m. CT scan.  If patient's exam acutely changes overnight repeat CT at that time.      Clinically Significant Risk Factors Present on Admission              # Hypoalbuminemia: Lowest albumin = 3.2 g/dL at 1/11/2024 12:40 PM, will monitor as appropriate  # Coagulation Defect: INR = 1.62 (Ref range: 0.85 - 1.15) and/or PTT = 31 Seconds (Ref range: 22 - 38 Seconds), will monitor for bleeding                        TIME SPENT ON THIS ENCOUNTER   I spent 55 minutes of time on the unit/floor managing the care of Kaiden Lowery excluding time performing procedures. I have personally reviewed the following data/imaging over the past 24 hours.     The patient was discussed with Dr. Tomlin.    Javier Arroyo, BRIANA  [address] [address]    Tel [phone]    History of Present Illness:  Kaiden Lowery is a 73 year old male with a unknown past medical history but ED provider reports pt lives in facility, dementia, and on AC for unknown reasons admitted on 1/11/2024 for fall hitting head. NSGY consulted for Left  "frontoparietal hygroma. No midline shift or mass effect    When I went to examine the patient the patient was alert pulling off his gown and trying to remove his risk balance.  When asked orientation questions he stared blankly at me with no response.  Patient was able to tell me his name.  I asked patient to perform certain tasks such as giving thumbs up and wiggling toes he was unable to do so.  I provided noxious stimulation to each extremity which she briskly localizes to stimulus.  This agitated patient and he said it do not touch me again and then he took off his gown.  Patient has no family or health records in the system to determine if this is his baseline level of function or if this is acute finding.  I would prefer patient admitted overnight with repeat scan in the morning.  This also allows us to get his records pulled over in Care Everywhere.  ED provider said patient was taking anticoagulation but was unable to say why he was taking it given that there were no records.  Patient was in A-fib when seen in ED it is possible that he is on anticoagulation for atrial fibrillation or paroxysmal atrial fibrillation.     No Known Allergies    Current Medications:   sodium chloride (PF)  3 mL Intracatheter Q8H       PRN Medications:  lidocaine 4%, lidocaine (buffered or not buffered), sodium chloride (PF)    Infusions:      Physical Examination:  Vitals: /80   Pulse 117   Temp 97.8  F (36.6  C) (Oral)   Resp 23   Ht 1.753 m (5' 9\")   Wt 72.6 kg (160 lb)   SpO2 96%   BMI 23.63 kg/m    General: Adult male patient, lying in bed  HEENT: Fall with scalp wound, no icterus, oral cavity/oropharynx pink and moist  Cardiac: afib   Pulm: Unlabored, expansion symmetric, no retractions or use of accessory muscles  Abdomen: Soft, non-distended, bowel sounds present  Extremities: Warm, no edema, distal pulses +2, well perfused  Skin: No rash or lesion  Psych: agitated  Neuro:  Mental status: Awake, alert, " oriented to self,  Language is fluent and coherent but no comprehension of complex commands, naming and repetition.  Cranial nerves: VFF, PERRL, conjugate gaze, EOMI, facial sensation intact, face symmetric, BEKAH shoulder shrug, BEKAH tongue midline, no dysarthria.   Motor: Normal bulk and tone. No abnormal movements. 5/5 strength in 4/4 extremities.   Sensory: Intact to light touch x 4 extremities  Coordination: Unable to perform coordination exam   Gait: BEKAH, deferred.        Labs and Imaging:      Results for orders placed or performed during the hospital encounter of 01/11/24 (from the past 24 hour(s))   [facility] Draw    Narrative    The following orders were created for panel order [place] [facility].  Procedure                               Abnormality         Status                     ---------                               -----------         ------                     Extra Blue Top Tube[[id number]]                              Final result               Extra Red Top Tube[[id number]]                               Final result               Extra Green Top (Lithium...[[id number]]                      Final result               Extra Purple Top Tube[[id number]]                            Final result               Extra Blood Bank Purple ...[[id number]]                      Final result                 Please view results for these tests on the individual orders.   Extra Blue Top Tube   Result Value Ref Range    Hold Specimen JIC    Extra Red Top Tube   Result Value Ref Range    Hold Specimen JIC    Extra Green Top (Lithium Heparin) Tube   Result Value Ref Range    Hold Specimen JIC    Extra Purple Top Tube   Result Value Ref Range    Hold Specimen JIC    Extra Blood Bank Purple Top Tube   Result Value Ref Range    Hold Specimen JIC    CBC with platelets differential    Narrative    The following orders were created for panel order CBC with platelets differential.  Procedure                               Abnormality          Status                     ---------                               -----------         ------                     CBC with platelets and d...[[id number]]  Abnormal            Final result                 Please view results for these tests on the individual orders.   Comprehensive metabolic panel   Result Value Ref Range    Sodium 143 135 - 145 mmol/L    Potassium 4.3 3.4 - 5.3 mmol/L    Carbon Dioxide (CO2) 26 22 - 29 mmol/L    Anion Gap 10 7 - 15 mmol/L    Urea Nitrogen 11.5 8.0 - 23.0 mg/dL    Creatinine 0.92 0.67 - 1.17 mg/dL    GFR Estimate 87 >60 mL/min/1.73m2    Calcium 9.2 8.8 - 10.2 mg/dL    Chloride 107 98 - 107 mmol/L    Glucose 161 (H) 70 - 99 mg/dL    Alkaline Phosphatase 66 40 - 150 U/L    AST 14 0 - 45 U/L    ALT 15 0 - 70 U/L    Protein Total 6.3 (L) 6.4 - 8.3 g/dL    Albumin 3.2 (L) 3.5 - 5.2 g/dL    Bilirubin Total 2.1 (H) <=1.2 mg/dL   INR   Result Value Ref Range    INR 1.62 (H) 0.85 - 1.15   Partial thromboplastin time   Result Value Ref Range    aPTT 31 22 - 38 Seconds   Troponin T, High Sensitivity (now)   Result Value Ref Range    Troponin T, High Sensitivity 24 (H) <=22 ng/L   CBC with platelets and differential   Result Value Ref Range    WBC Count 10.4 4.0 - 11.0 10e3/uL    RBC Count 4.50 4.40 - 5.90 10e6/uL    Hemoglobin 14.4 13.3 - 17.7 g/dL    Hematocrit 44.8 40.0 - 53.0 %     78 - 100 fL    MCH 32.0 26.5 - 33.0 pg    MCHC 32.1 31.5 - 36.5 g/dL    RDW 15.4 (H) 10.0 - 15.0 %    Platelet Count 232 150 - 450 10e3/uL    % Neutrophils 84 %    % Lymphocytes 10 %    % Monocytes 6 %    % Eosinophils 0 %    % Basophils 0 %    % Immature Granulocytes 0 %    NRBCs per 100 WBC 0 <1 /100    Absolute Neutrophils 8.7 (H) 1.6 - 8.3 10e3/uL    Absolute Lymphocytes 1.0 0.8 - 5.3 10e3/uL    Absolute Monocytes 0.7 0.0 - 1.3 10e3/uL    Absolute Eosinophils 0.0 0.0 - 0.7 10e3/uL    Absolute Basophils 0.0 0.0 - 0.2 10e3/uL    Absolute Immature Granulocytes 0.0 <=0.4 10e3/uL    Absolute NRBCs 0.0  10e3/uL   D dimer quantitative   Result Value Ref Range    D-Dimer Quantitative 3.45 (H) 0.00 - 0.50 ug/mL FEU    Narrative    This D-dimer assay is intended for use in conjunction with a clinical pretest probability assessment model to exclude pulmonary embolism (PE) and deep venous thrombosis (DVT) in outpatients suspected of PE or DVT. The cut-off value is 0.50 ug/mL FEU.    For patients 50 years of age or older, the application of age-adjusted cut-off values for D-Dimer may increase the specificity without significant effect on sensitivity. The literature suggested calculation age adjusted cut-off in ug/L = age in years x 10 ug/L. The results in this laboratory are reported as ug/mL rather than ug/L. The calculation for age adjusted cut off in ug/mL= age in years x 0.01 ug/mL. For example, the cut off for a 76 year old male is 74 x 0.01 ug/mL = 0.76 ug/mL (760 ug/L).    M Valdemar et al. Age adjusted D-dimer cut-off levels to rule out pulmonary embolism: The ADJUST-PE Study. HECTOR 2014;311:4387-3016.; HJ Wili et al. Diagnostic accuracy of conventional or age adjusted D-dimer cutoff values in older patients with suspected venous thromboembolism. Systemic review and meta-analysis. BMJ 2013:346:f2492.   CT Head w/o Contrast    Narrative    EXAM: CT HEAD W/O CONTRAST, CT CERVICAL SPINE W/O CONTRAST  LOCATION: [facility]  DATE: 1/11/2024    INDICATION: Fall on blood thinner. Pain.  COMPARISON: 12/4/2023 and 11/24/2023.   TECHNIQUE:   1) Routine CT Head without IV contrast. Multiplanar reformats. Dose reduction techniques were used.  2) Routine CT Cervical Spine without IV contrast. Multiplanar reformats. Dose reduction techniques were used.    FINDINGS:   HEAD CT:   INTRACRANIAL CONTENTS: New since the prior exam, slightly increased prominence of the subdural spaces overlying the right cerebral and left frontoparietal convexities measuring up to 4 mm on the right and 3 mm on the left which  maintains CSF density   without significant mass effect or midline shift. Patent basal cisterns. No evidence of an acute transcortical confluent infarct. A punctate chronic left cerebellar hemisphere infarct, as before. Mild presumed chronic small vessel ischemic changes. Mild   to moderate cerebral parenchymal volume loss with slightly disproportionate involvement of the bilateral medial temporal lobes. No hydrocephalus.    VISUALIZED ORBITS/SINUSES/MASTOIDS: No acute intraorbital finding. No significant paranasal sinus mucosal disease. New opacification of the left mastoid antrum and adjacent air cells as well as the mesotympanum/epitympanum.    BONES/SOFT TISSUES: No acute calvarial injury. Specifically, no definite acute temporal bone fracture although this exam is not specifically tailored for the assessment of the temporal bones. A small right frontal scalp contusion anteriorly with tiny   foci of air, raising the possibility of a superimposed laceration.    CERVICAL SPINE CT:   VERTEBRA: Diffuse osseous demineralization without an acute displaced fracture. No compression deformity or evidence of traumatic listhesis. Unchanged alignment with straightening of the normal lordosis and 2 mm degenerative retrolisthesis at C3-C4.   Multilevel interbody degenerative change, worst and moderate at C3-C4 and C5-C7 where there are partially calcified posterior disc-osteophyte complexes. Mild to moderate multilevel facet arthrosis. Curvilinear periodontal ligamentous calcifications,   likely CPPD related.    CANAL/FORAMINA: Multilevel spondylosis and resultant spinal canal and foraminal stenosis are similar to and described in better detail on recent CT.    EXTRASPINAL: No prevertebral edema. Clear visualized lungs.      Impression    IMPRESSION:  HEAD CT:  1.  Thin right holohemispheric and left frontoparietal hypoattenuating subdural fluid collections, most compatible with subdural hygromas or chronic hematomas. No  significant mass effect or midline shift.  2.  A small right frontal scalp contusion and probable laceration without an acute calvarial injury.  3.  New partial opacification of the left middle ear cavity and mastoid antrum without a definite temporal bone fracture. Correlate clinically for otomastoiditis. Please note that this exam is not specifically tailored for the assessment of the temporal   bones.     CERVICAL SPINE CT:  1.  No CT evidence for acute fracture or post traumatic subluxation.   CT Cervical Spine w/o Contrast    Narrative    EXAM: CT HEAD W/O CONTRAST, CT CERVICAL SPINE W/O CONTRAST  LOCATION: [facility]  DATE: 1/11/2024    INDICATION: Fall on blood thinner. Pain.  COMPARISON: 12/4/2023 and 11/24/2023.   TECHNIQUE:   1) Routine CT Head without IV contrast. Multiplanar reformats. Dose reduction techniques were used.  2) Routine CT Cervical Spine without IV contrast. Multiplanar reformats. Dose reduction techniques were used.    FINDINGS:   HEAD CT:   INTRACRANIAL CONTENTS: New since the prior exam, slightly increased prominence of the subdural spaces overlying the right cerebral and left frontoparietal convexities measuring up to 4 mm on the right and 3 mm on the left which maintains CSF density   without significant mass effect or midline shift. Patent basal cisterns. No evidence of an acute transcortical confluent infarct. A punctate chronic left cerebellar hemisphere infarct, as before. Mild presumed chronic small vessel ischemic changes. Mild   to moderate cerebral parenchymal volume loss with slightly disproportionate involvement of the bilateral medial temporal lobes. No hydrocephalus.    VISUALIZED ORBITS/SINUSES/MASTOIDS: No acute intraorbital finding. No significant paranasal sinus mucosal disease. New opacification of the left mastoid antrum and adjacent air cells as well as the mesotympanum/epitympanum.    BONES/SOFT TISSUES: No acute calvarial injury.  Specifically, no definite acute temporal bone fracture although this exam is not specifically tailored for the assessment of the temporal bones. A small right frontal scalp contusion anteriorly with tiny   foci of air, raising the possibility of a superimposed laceration.    CERVICAL SPINE CT:   VERTEBRA: Diffuse osseous demineralization without an acute displaced fracture. No compression deformity or evidence of traumatic listhesis. Unchanged alignment with straightening of the normal lordosis and 2 mm degenerative retrolisthesis at C3-C4.   Multilevel interbody degenerative change, worst and moderate at C3-C4 and C5-C7 where there are partially calcified posterior disc-osteophyte complexes. Mild to moderate multilevel facet arthrosis. Curvilinear periodontal ligamentous calcifications,   likely CPPD related.    CANAL/FORAMINA: Multilevel spondylosis and resultant spinal canal and foraminal stenosis are similar to and described in better detail on recent CT.    EXTRASPINAL: No prevertebral edema. Clear visualized lungs.      Impression    IMPRESSION:  HEAD CT:  1.  Thin right holohemispheric and left frontoparietal hypoattenuating subdural fluid collections, most compatible with subdural hygromas or chronic hematomas. No significant mass effect or midline shift.  2.  A small right frontal scalp contusion and probable laceration without an acute calvarial injury.  3.  New partial opacification of the left middle ear cavity and mastoid antrum without a definite temporal bone fracture. Correlate clinically for otomastoiditis. Please note that this exam is not specifically tailored for the assessment of the temporal   bones.     CERVICAL SPINE CT:  1.  No CT evidence for acute fracture or post traumatic subluxation.   Troponin T, High Sensitivity (now)   Result Value Ref Range    Troponin T, High Sensitivity 21 <=22 ng/L       All relevant imaging and laboratory values personally reviewed.

## 2024-01-11 NOTE — ED NOTES
Bed: [facility]  Expected date:   Expected time:   Means of arrival:   Comments:  [facility] 73m fall on thinners, facial inj

## 2024-01-11 NOTE — ED TRIAGE NOTES
Patient comes form a care facility.  Was eating in bed, went to stand and felt dizzy, light headed, did fall and has a bump on his head.  Is on thinners.      Triage Assessment (Adult)       Row Name 01/11/24 1219          Triage Assessment    Airway WDL WDL        Respiratory WDL    Respiratory WDL WDL        Skin Circulation/Temperature WDL    Skin Circulation/Temperature WDL WDL        Cardiac WDL    Cardiac WDL WDL        Peripheral/Neurovascular WDL    Peripheral Neurovascular WDL WDL        Cognitive/Neuro/Behavioral WDL    Cognitive/Neuro/Behavioral WDL WDL

## 2024-01-11 NOTE — DISCHARGE INSTRUCTIONS
Discussed plan with patient and family:    Patient is to hold his anticoagulation until his repeat head CT in approximately two weeks with neurosurgery.  If he is permitted to restart his anticoagulation, he will need to schedule an appointment to remove his IVC filter as this would no longer be warranted.   If he does not get a call from neurosurgery for his follow-up appointment, please call [phone] , [facility] Neurosurgery
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
## 2024-01-11 NOTE — MEDICATION SCRIBE - ADMISSION MEDICATION HISTORY
Medication Scribe Admission Medication History    Admission medication history is complete. The information provided in this note is only as accurate as the sources available at the time of the update.    Information Source(s): Caregiver, Facility (TCU/NH/) medication list/MAR, and CareEverywhere/SureScripts via phone    Pertinent Information: mar was provided from [facility], spoke with on call nurse    Changes made to PTA medication list:  Added: None  Deleted: None  Changed: None    Medication Affordability:  Not including over the counter (OTC) medications, was there a time in the past 3 months when you did not take your medications as prescribed because of cost?: No    Allergies reviewed with patient and updates made in EHR: yes    Medication History Completed By: SAE PIERRE 1/11/2024 4:08 PM    PTA Med List   Medication Sig Last Dose    apixaban ANTICOAGULANT (ELIQUIS ANTICOAGULANT) 5 MG tablet Take 5 mg by mouth 2 times daily 1/11/2024 at am    cyanocobalamin (VITAMIN B-12) 1000 MCG tablet Take 1,000 mcg by mouth daily 1/11/2024 at am    digoxin (LANOXIN) 125 MCG tablet Take 125 mcg by mouth daily 1/11/2024 at am    gabapentin (NEURONTIN) 100 MG capsule Take 100 mg by mouth 3 times daily 1/11/2024 at am    melatonin 3 MG tablet Take 3 tablets by mouth nightly as needed 1/10/2024 at pm    metoprolol tartrate (LOPRESSOR) 50 MG tablet Take 50 mg by mouth 2 times daily 1/11/2024 at am    Multiple Vitamins-Minerals (CERTAVITE SENIOR) TABS Take 1 tablet by mouth daily 1/11/2024 at am    OLANZapine (ZYPREXA) 2.5 MG tablet Take 2.5 mg by mouth 2 times daily as needed Unknown at prn    Thiamine Mononitrate (CYTO B1 PO) Take 1 tablet by mouth daily 1/11/2024 at am    traZODone (DESYREL) 100 MG tablet Take 200 mg by mouth at bedtime 1/10/2024 at pm    traZODone (DESYREL) 50 MG tablet Take 25 mg by mouth 2 times daily as needed Unknown at prn
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
## 2024-01-12 NOTE — PLAN OF CARE
PRIMARY DIAGNOSIS: GENERALIZED WEAKNESS/ FALL    OUTPATIENT/OBSERVATION GOALS TO BE MET BEFORE DISCHARGE  1. Orthostatic performed: N/A    2. Tolerating PO medications: Pt tolerates fluids and pills but was given IM Zyprexa at 0500 for being combative and not direct able.      3. Return to near baseline physical activity: No Bedrest    4. Cleared for discharge by consultants (if involved): No     Discharge Planner Nurse   Safe discharge environment identified: Yes  Barriers to discharge: Yes       Entered by: Maile Thurston RN 01/12/2024 5:58 AM     Please review provider order for any additional goals.   Nurse to notify provider when observation goals have been met and patient is ready for discharge.Goal Outcome Evaluation:  Pt is alert to self. Has hx of falls, dementia and psyche issues. Resident of memory care. At at 11:40 pm found pt on the ground laying flat at bedside. Confused at baseline. VSS, pt neurologically intact and unchanged. Right elbow scrape noted. Resident notified. Head CT ordered and 1:1 sitter now with pt. At 0500 am Pt became more combative, non direct able trying to get out of bed. Called MD for IM order for Zyprexa. Med was Effective. Urine is dark odorous with red sediments. UA collected and pending. Pt is incontinent, pulled out prima fit. New brief in place.
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
## 2024-01-12 NOTE — ED NOTES
PT asking for a drink of water and was given some water. Pt started to cough and had a hard time swallowing water. RN was notified

## 2024-01-12 NOTE — PLAN OF CARE
obPRIMARY DIAGNOSIS: GENERALIZED WEAKNESS    OUTPATIENT/OBSERVATION GOALS TO BE MET BEFORE DISCHARGE  1. Orthostatic performed: No    2. Tolerating PO medications: Yes    3. Return to near baseline physical activity: No    4. Cleared for discharge by consultants (if involved): No    Discharge Planner Nurse   Safe discharge environment identified: Yes  Barriers to discharge: Yes       Entered by: Bulmaro Perdomo RN 01/12/2024 9:56 AM     Please review provider order for any additional goals.   Nurse to notify provider when observation goals have been met and patient is ready for discharge.Goal Outcome Evaluation:

## 2024-01-12 NOTE — TELEPHONE ENCOUNTER
PATIENT NAME:  Kaiden Lowery  YOB: 1950  MRN: [id number]  SURGEON: Dr. Tomlin  DATE of CONSULT: 01/11/2024  Consult for Left frontoparietal hygroma     FOLLOW-UP PLAN:    Hospital Follow Up Visit: 2 weeks  Provider: LITO on Dr. Tomlin clinic day vs telephone    DIAGNOSTICS:  CT  DISPOSITION:  pending    ADDITIONAL INSTRUCTIONS FOR MEDICAL STAFF:     Karley Ramos RN, CNRN

## 2024-01-12 NOTE — PLAN OF CARE
PRIMARY DIAGNOSIS: GENERALIZED WEAKNESS/ FALLS    OUTPATIENT/OBSERVATION GOALS TO BE MET BEFORE DISCHARGE  1. Orthostatic performed: N/A    2. Tolerating PO medications: Yes    3. Return to near baseline physical activity: No Felled at 23:40    4. Cleared for discharge by consultants (if involved): No    Discharge Planner Nurse   Safe discharge environment identified: No Lives in memory care  Barriers to discharge: Pt is not able to care for himself       Entered by: Maile Thurston RN 01/12/2024 1:59 AM     Please review provider order for any additional goals.   Nurse to notify provider when observation goals have been met and patient is ready for discharge.

## 2024-01-12 NOTE — PROGRESS NOTES
PRIMARY DIAGNOSIS: SYNCOPE/TIA  OUTPATIENT/OBSERVATION GOALS TO BE MET BEFORE DISCHARGE:  1. Orthostatic performed: N/A    2. Diagnostic testing complete & at baseline neurologic testing: No    3. Cleared by consultants (if involved): No    4. Interpretation of cardiac rhythm per telemetry tech: Atrial fibrillation.     5. Tolerating adequate PO diet and medications: Yes    6. Return to near baseline physical activity or neurologic status: No    Discharge Planner Nurse   Safe discharge environment identified: Yes  Barriers to discharge: Yes       Entered by: Katharina Syed RN 01/12/2024 2:41 PM   Uneventful shift. Patient slept in between cares. Would wake up intermittently but not follow commands and would resist cares. Disoriented x4. Bedside attendant this shift  Please review provider order for any additional goals.   Nurse to notify provider when observation goals have been met and patient is ready for discharge.

## 2024-01-12 NOTE — ED NOTES
Spoke with attending regarding pt PE finding.  Attending reported that PE is improving since last scan.  IV treatment is not conducive to brain hematomas.  Pt is currently on anticoagulant, as well.  Attending will notify writer of any other treatment needed.

## 2024-01-12 NOTE — PROGRESS NOTES
01/12/24 1030   Appointment Info   Signing Clinician's Name / Credentials (OT) Luz Camacho OTD OTR/L   Quick Adds   Quick Adds Certification   Living Environment   People in Home facility resident   Current Living Arrangements assisted living;extended care facility  (memory care)   Home Accessibility no concerns   Living Environment Comments Pt unable to provide home set up at this time   Self-Care   Equipment Currently Used at Home walker, rolling   Fall history within last six months yes   Activity/Exercise/Self-Care Comment Pt poor historian, per chart has had recent cognitive decline - recently moved into memory care facility, gets assist with most ADLs and all IADLs from staff. Has history of refusing cares at times   General Information   Onset of Illness/Injury or Date of Surgery 01/11/24   Referring Physician Padmini Nowak MD   Patient/Family Therapy Goal Statement (OT) None stated   Additional Occupational Profile Info/Pertinent History of Current Problem Kaiden Lowery is a 73 year old male admitted on 1/11/2024. He has a history of HTN, mood disorder, dementia, encephalopathy, PE, DVT, cognitive impairment, atrial fibrillation and is admitted for observation and serial imaging after fall and found subdural hygromas.   Existing Precautions/Restrictions fall   Limitations/Impairments safety/cognitive   Cognitive Status Examination   Orientation Status not oriented to person, place or time   Affect/Mental Status (Cognitive) confused;flat/blunted affect   Follows Commands physical/tactile prompts required;verbal cues/prompting required   Cognitive Status Comments Able to answer simple questions and commands   Posture   Posture forward head position   Range of Motion Comprehensive   General Range of Motion bilateral upper extremity ROM WFL   Strength Comprehensive (MMT)   Comment, General Manual Muscle Testing (MMT) Assessment Min generalized weakness   Bed Mobility   Bed Mobility supine-sit    Supine-Sit [place] (Bed Mobility) minimum assist (75% patient effort)   Assistive Device (Bed Mobility) bed rails   Transfers   Transfers sit-stand transfer;toilet transfer   Sit-Stand Transfer   Sit-Stand [place] (Transfers) minimum assist (75% patient effort)   Toilet Transfer   [place] Level (Toilet Transfer) minimum assist (75% patient effort)   Assistive Device (Toilet Transfer) grab bars/safety frame   Balance   Balance Assessment standing dynamic balance   Standing Balance: Dynamic 2-person assist;minimal assist   Activities of Daily Living   BADL Assessment/Intervention lower body dressing;grooming;toileting   Lower Body Dressing Assessment/Training   [place] Level (Lower Body Dressing) maximum assist (25% patient effort)   Grooming Assessment/Training   [place] Level (Grooming) maximum assist (25% patient effort)   Toileting   [place] Level (Toileting) moderate assist (50% patient effort)   Clinical Impression   Criteria for Skilled Therapeutic Interventions Met (OT) Yes, treatment indicated   OT Diagnosis Decreased ind with ADLs and safety   Influenced by the following impairments Atrial fibrillation with RVR   OT Problem List-Impairments impacting ADL activity tolerance impaired;cognition;balance;mobility;strength   Assessment of Occupational Performance 3-5 Performance Deficits   Identified Performance Deficits dressing, toileting, fxl mobility/safety   Planned Therapy Interventions (OT) ADL retraining;balance training;cognition;progressive activity/exercise;transfer training   Clinical Decision Making Complexity (OT) detailed assessment/moderate complexity   Risk & Benefits of therapy have been explained evaluation/treatment results reviewed;care plan/treatment goals reviewed;risks/benefits reviewed;current/potential barriers reviewed;patient   OT Total Evaluation Time   OT Eval, Moderate Complexity Minutes (38322) 10   Therapy Certification   Medical Diagnosis Subdural  hematoma   Start of Care Date 01/11/24   Certification date from 01/12/24   Certification date to 02/12/24   OT Goals   Therapy Frequency (OT) 5 times/week   OT Predicted Duration/Target Date for Goal Attainment 01/19/24   OT Goals Hygiene/Grooming;Toilet Transfer/Toileting   OT: Hygiene/Grooming supervision/stand-by assist;while standing   OT: Toilet Transfer/Toileting Minimal assist;toilet transfer;cleaning and garment management   Interventions   Interventions Quick Adds Self-Care/Home Management   Self-Care/Home Management   Self-Care/Home Mgmt/ADL, Compensatory, Meal Prep Minutes (51517) 10   Symptoms Noted During/After Treatment (Meal Preparation/Planning Training) fatigue   Treatment Detail/Skilled Intervention Evaluation completed, treatment initiated. Fxl mob w/ min-mod A x 2 using FWW, mod cueing for upright positioning and balance. Mod A for safe mgmt and advancement of walker. Tolerated walking to/from bathroom ~ 75 feet min A x 2. Toilet transfer min A, max A for garment mgmt and pericares. Min A STS from toilet. Direct hand off to PT.   OT Discharge Planning   OT Plan Simple ADL routines, g/h, command following, short transfers and mobility, dressing if applicable   OT Discharge Recommendation (DC Rec) home with assist  (Return to memory care)   OT Rationale for DC Rec Pending facility ability to provide assist with all ADLs and IADLs   OT Brief overview of current status Min A x 2 fxl mob and STS   Total Session Time   Timed Code Treatment Minutes 10   Total Session Time (sum of timed and untimed services) 20    M [facility]  OUTPATIENT OCCUPATIONAL THERAPY  EVALUATION  PLAN OF TREATMENT FOR OUTPATIENT REHABILITATION  (COMPLETE FOR INITIAL CLAIMS ONLY)  Patient's Last Name, First Name, M.I.  YOB: 1950  Kaiden Lowery                          Provider's Name  [facility] Medical Record No.  [id number]                              Onset Date:  01/11/24   Start of Care Date:  01/11/24   Type:     ___PT   _X_OT   ___SLP Medical Diagnosis:  Subdural hematoma                    OT Diagnosis:  Decreased ind with ADLs and safety Visits from SOC:  1     See note for plan of treatment, functional goals and certification details    I CERTIFY THE NEED FOR THESE SERVICES FURNISHED UNDER        THIS PLAN OF TREATMENT AND WHILE UNDER MY CARE     (Physician co-signature of this document indicates review and certification of the therapy plan).

## 2024-01-12 NOTE — ED NOTES
Pt was combative and refused EDT or RN to touch his chest and fix the EKG lead wires. RN was notified that Pt will hit if touch him.

## 2024-01-12 NOTE — PROGRESS NOTES
Pt was combative, nondirective trying get out of bed.Called Resident Jacquie Rizo for IM order for Zyprexa.

## 2024-01-12 NOTE — PROGRESS NOTES
Pt found on the ground laying flat at bedside. Confused at baseline. VSS, pt neurologically intact and unchanged. Right elbow scrape noted. Resident notified. Head CT ordered and 1:1 sitter now with pt.
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
## 2024-01-13 NOTE — PROGRESS NOTES
"PRIMARY DIAGNOSIS: \"GENERIC\" NURSING  OUTPATIENT/OBSERVATION GOALS TO BE MET BEFORE DISCHARGE:  ADLs back to baseline: No    Activity and level of assistance: Assist x 2    Pain status: Does not appear to be in pain    Return to near baseline physical activity: No     Discharge Planner Nurse   Safe discharge environment identified: Yes  Barriers to discharge: Yes    "

## 2024-01-13 NOTE — PROGRESS NOTES
"   01/12/24 1045   Appointment Info   Signing Clinician's Name / Credentials (PT) Melinda Harman,PT   Quick Adds   Quick Adds Certification   Living Environment   People in Home   (memory care)   Living Environment Comments Per the chart. Kaiden lives at [facility]. He gets help with most ADLs and all IADLs. He has a history of refusing cares. \"He has been refusing to let Memory Care staff help him with bathing or dressing or changing his depends lately\". He uses a walker for mobility.  (Pt was not able to provide home hx)   Self-Care   Current Activity Tolerance moderate   Equipment Currently Used at Home walker, rolling   Activity/Exercise/Self-Care Comment Pt was not able to provide home hx   General Information   Onset of Illness/Injury or Date of Surgery 01/11/24   Referring Physician Lula Zamora MD   Patient/Family Therapy Goals Statement (PT) none stated.   Pertinent History of Current Problem (include personal factors and/or comorbidities that impact the POC) Per the chart,  73 year old male presents to the Emergency Department for evaluation of dizziness, lightheadedness and a fall.  Patient is on blood thinners for atrial fibrillation, but also for pulmonary emboli.   Existing Precautions/Restrictions fall  (1:1)   Weight-Bearing Status - LLE weight-bearing as tolerated   Weight-Bearing Status - RLE weight-bearing as tolerated   Cognition   Orientation Status (Cognition) oriented to;person   Cognitive Status Comments flat affect but followed commands with cues.   Pain Assessment   Patient Currently in Pain No   Range of Motion (ROM)   Range of Motion ROM is WNL   ROM Comment Bilat LEs   Strength (Manual Muscle Testing)   Strength Comments Grossly 4/5 with MMT   Bed Mobility   Comment, (Bed Mobility) Sit>supine with min A x 1   Transfers   Comment, (Transfers) Sit<>stand with FWW with min A x1   Gait/Stairs (Locomotion)   Limestone Level (Gait) minimum assist (75% patient " effort);2 person assist   Assistive Device (Gait) walker, front-wheeled   Distance in Feet (Gait) 10'   Pattern (Gait) swing-through   Deviations/Abnormal Patterns (Gait) shailesh decreased;gait speed decreased   Balance   Balance Comments Min A x 2 with FWW   Sensory Examination   Sensory Perception WNL   Sensory Perception Comments Bilat LEs   Clinical Impression   Criteria for Skilled Therapeutic Intervention Yes, treatment indicated   PT Diagnosis (PT) Impaired functional mobility   Influenced by the following impairments dec strength, dec bal, dec endurance, confusion   Functional limitations due to impairments bed mobility, transfers, gait,   Clinical Presentation (PT Evaluation Complexity) evolving   Clinical Presentation Rationale Pt presents medically diagnosed.   Clinical Decision Making (Complexity) moderate complexity   Planned Therapy Interventions (PT) balance training;bed mobility training;gait training;strengthening;transfer training   Risk & Benefits of therapy have been explained evaluation/treatment results reviewed;care plan/treatment goals reviewed;risks/benefits reviewed;patient;participants voiced agreement with care plan   PT Total Evaluation Time   PT Eval, Moderate Complexity Minutes (34217) 15   Therapy Certification   Start of care date 01/12/24   Certification date from 01/12/24   Certification date to 01/19/24   Medical Diagnosis Fall subdural hematoma   Physical Therapy Goals   PT Frequency Daily   PT Predicted Duration/Target Date for Goal Attainment 01/19/24   PT Goals Bed Mobility;Transfers;Gait   PT: Bed Mobility Minimal assist;Supine to/from sit;Rolling   PT: Transfers Supervision/stand-by assist;Sit to/from stand;Bed to/from chair;Assistive device   PT: Gait Minimal assist;Rolling walker;150 feet   Interventions   Interventions Quick Adds Gait Training;Therapeutic Procedure   Therapeutic Procedure/Exercise   Ther. Procedure: strength, endurance, ROM, flexibillity Minutes (92209) 8    Treatment Detail/Skilled Intervention Bilat LE ex x 10 reps   Gait Training   Gait Training Minutes (09879) 5   Distance in Feet 50'   Flagler Level (Gait Training) minimum assist (75% patient effort)   Physical Assistance Level (Gait Training) 2 person assist   Weight Bearing (Gait Training) weight-bearing as tolerated   Assistive Device (Gait Training) rolling walker  (FWW)   Pattern Analysis (Gait Training) swing-through gait   Gait Analysis Deviations decreased step length;decreased shailesh   Impairments (Gait Analysis/Training) strength decreased;balance impaired   PT Discharge Planning   PT Plan bed mobility, transfers and gait with FWW, LE ex.   PT Discharge Recommendation (DC Rec)   (back to his memory care with home PT.)   PT Rationale for DC Rec Pt does need asssist with mobility and may do better in the memory care unit with PT in that setting.   PT Brief overview of current status PT eval, transfers with min A x 1 , gait with fWW with min Ax2, LE ex. bed mobility with A x 1.   PT Equipment Needed at Discharge walker, rolling  (FWW)   Total Session Time   Timed Code Treatment Minutes 13   Total Session Time (sum of timed and untimed services) 2   [facility]  OUTPATIENT PHYSICAL THERAPY EVALUATION  PLAN OF TREATMENT FOR OUTPATIENT REHABILITATION  (COMPLETE FOR INITIAL CLAIMS ONLY)  Patient's Last Name, First Name, M.I.  YOB: 1950  Kaiden Lowery                        Provider's Name  [facility] Medical Record No.  [id number]                             Onset Date:  01/11/24   Start of Care Date:  01/12/24   Type:     _X_PT   ___OT   ___SLP Medical Diagnosis:  Fall subdural hematoma              PT Diagnosis:  Impaired functional mobility Visits from SOC:  1     See note for plan of treatment, functional goals and certification details    I CERTIFY THE NEED FOR THESE SERVICES FURNISHED UNDER        THIS PLAN OF  TREATMENT AND WHILE UNDER MY CARE     (Physician co-signature of this document indicates review and certification of the therapy plan).

## 2024-01-13 NOTE — PLAN OF CARE
"PRIMARY DIAGNOSIS: \"GENERIC\" NURSING  OUTPATIENT/OBSERVATION GOALS TO BE MET BEFORE DISCHARGE:  ADLs back to baseline: No    Activity and level of assistance: Bedrest. HOB above 30 degrees. Sitter at bedside    Pain status: Pain free.    Return to near baseline physical activity: No     Discharge Planner Nurse   Safe discharge environment identified: No  Barriers to discharge: Yes       Entered by: Teresa Reddy RN 01/13/2024 6:26 AM     Please review provider order for any additional goals.   Nurse to notify provider when observation goals have been met and patient is ready for discharge.Goal Outcome Evaluation:    Pt is confused, oriented to self. VSS in RA. Tele -Afib.  No discomfort/pain noted.                    "
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
## 2024-01-13 NOTE — PROGRESS NOTES
"PRIMARY DIAGNOSIS: \"GENERIC\" NURSING  OUTPATIENT/OBSERVATION GOALS TO BE MET BEFORE DISCHARGE:  ADLs back to baseline: No    Activity and level of assistance: Bedrest orders, assist x 2    Pain status: Does not appear to be in pain    Return to near baseline physical activity: No     Discharge Planner Nurse   Safe discharge environment identified: Yes  Barriers to discharge: Yes       Entered by: Que Del Real RN 01/12/2024 10:36 PM     Confused, oriented to self only. VSS on RA. Does not appear to be experiencing pain/discomfort. On tele, stable Afib. R PIV SL. Regular diet, pt tolerating well. Bedrest orders, HOB to stay above 30 degrees. Nursing continue to monitor.  "

## 2024-01-13 NOTE — PROGRESS NOTES
"PRIMARY DIAGNOSIS: \"GENERIC\" NURSING  OUTPATIENT/OBSERVATION GOALS TO BE MET BEFORE DISCHARGE:  ADLs back to baseline: No    Activity and level of assistance: Bedrest, Assist x2 on cares with sitter.    Pain status: Pain free.     Return to near baseline physical activity: No     Discharge Planner Nurse   Safe discharge environment identified: Yes  Barriers to discharge: Yes       Entered by: Teresa Reddy RN 01/13/2024 4:33 AM     Please review provider order for any additional goals.   Nurse to notify provider when observation goals have been met and patient is ready for discharge.    Pt is confused, wakes up from time to time to ask what time it is. VSS in RA. No discomfort/pain noted. On Tele, stable Afib. HOB above 30 degrees.   "

## 2024-01-13 NOTE — PLAN OF CARE
"PRIMARY DIAGNOSIS: \"GENERIC\" NURSING  OUTPATIENT/OBSERVATION GOALS TO BE MET BEFORE DISCHARGE:  ADLs back to baseline: No    Activity and level of assistance: bedrest discont. TBD    Pain status: Pain free.    Return to near baseline physical activity: No     Discharge Planner Nurse   Safe discharge environment identified: No  Barriers to discharge: Yes       Entered by: Lali Zeng RN 01/13/2024 11:01 AM     Please review provider order for any additional goals.   Nurse to notify provider when observation goals have been met and patient is ready for discharge.Goal Outcome Evaluation:                        "

## 2024-01-14 NOTE — PROGRESS NOTES
PRIMARY DIAGNOSIS: ACUTE PAIN  OUTPATIENT/OBSERVATION GOALS TO BE MET BEFORE DISCHARGE:  1. Pain Status: Pain free.    2. Return to near baseline physical activity: No    3. Cleared for discharge by consultants (if involved): No    Discharge Planner Nurse   Safe discharge environment identified: Yes  Barriers to discharge: Yes       Entered by: Car Moses RN 01/14/2024 5:30 PM     Please review provider order for any additional goals.   Nurse to notify provider when observation goals have been met and patient is ready for discharge.
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
## 2024-01-14 NOTE — PROGRESS NOTES
PRIMARY DIAGNOSIS: ACUTE PAIN  OUTPATIENT/OBSERVATION GOALS TO BE MET BEFORE DISCHARGE:  1. Pain Status: Pain free.    2. Return to near baseline physical activity: No    3. Cleared for discharge by consultants (if involved): No    Discharge Planner Nurse   Safe discharge environment identified: Yes  Barriers to discharge: Yes       Entered by: Katharina Syed RN 01/14/2024 1:07 PM   Bed became available on P1. Report given to Genesis SANDERS on P1. Patient transported via motorized cart with all his belongings.  Please review provider order for any additional goals.   Nurse to notify provider when observation goals have been met and patient is ready for discharge.

## 2024-01-14 NOTE — PLAN OF CARE
A to self. Neuros stable. A2 gb/w. No void until 1800 today. Bladder scan mid 250s. Encouraged fluids. Tele Afib CVR. HOB elevated per order

## 2024-01-14 NOTE — PLAN OF CARE
"PRIMARY DIAGNOSIS: \"GENERIC\" NURSING  OUTPATIENT/OBSERVATION GOALS TO BE MET BEFORE DISCHARGE:  ADLs back to baseline: No    Activity and level of assistance: Did not get up during the shift.     Pain status: Pain free.    Return to near baseline physical activity: No     Discharge Planner Nurse   Safe discharge environment identified: Yes  Barriers to discharge: Yes       Entered by: Dorian Faria RN 01/14/2024 4:13 AM     Please review provider order for any additional goals.   Nurse to notify provider when observation goals have been met and patient is ready for discharge.Goal Outcome Evaluation: Pt is alert to self and intermittently confused to situation, place and time. Pt was sleepy all through shift. Neuros intact. Pt encouraged to shift weight. Pt voiding without difficulty. Diet is regular but needs help with feeding.                         "

## 2024-01-14 NOTE — PLAN OF CARE
"PRIMARY DIAGNOSIS: \"GENERIC\" NURSING  OUTPATIENT/OBSERVATION GOALS TO BE MET BEFORE DISCHARGE:  ADLs back to baseline: No    Activity and level of assistance: assist of 2    Pain status: Pain free.    Return to near baseline physical activity: No     Discharge Planner Nurse   Safe discharge environment identified: Yes  Barriers to discharge: Yes       Entered by: Dorian Faria RN 01/14/2024 5:36 AM     Please review provider order for any additional goals.   Nurse to notify provider when observation goals have been met and patient is ready for discharge.Goal Outcome Evaluation:  Pt is alert to self and intermittently confused to situation, place and time. Pt remains sleepy all through shift. Neuros intact. Pt encouraged to shift weight. Pt voiding without difficulty. Diet is regular but needs help with feeding. Telemetry reading A-Fib with intermittent RVR.                            "

## 2024-01-14 NOTE — UTILIZATION REVIEW
Admission Status; Secondary Review Determination     Under the authority of the Utilization Management Committee, the utilization review process indicated a secondary review on the above patient.  The review outcome is based on review of the medical records, discussions with staff, and applying clinical experience noted on the date of the review.       (x) Observation Status Appropriate      RATIONALE FOR DETERMINATION/SUMMARY:   73 year old male admitted on 1/11/2024. He has a history of HTN, mood disorder, dementia, encephalopathy, PE, DVT, cognitive impairment, atrial fibrillation and is admitted for observation and serial imaging after fall and found subdural hygromas vs hematomas.  Anticoagulation held.  Patient neurologically not worsening and neurosurgery plans no interventions in the hospital.  He had some behavioral agitation episodes with confusion earlier in stay but improving.  [place] to likely discharge soon per notes.      The severity of illness, intensity of service provided, expected LOS and risk for adverse outcome make the care appropriate for observation.     The information on this document is developed by the utilization review team in order for the business office to ensure compliance.  This only denotes the appropriateness of proper admission status and does not reflect the quality of care rendered.       The definitions of Inpatient Status and Observation Status used in making the determination above are those provided in the CMS Coverage Manual, Chapter 1 and Chapter 6, section 70.4.     Sincerely,    Balwinder Lowery DO, [facility]  Utilization Review  Physician Advisor

## 2024-01-15 NOTE — PROVIDER NOTIFICATION
Dr. Osman Phalen [facility] House Officer was notified about 6 beat run of V. Tach at 0757. VSS and patient slept through the event.

## 2024-01-15 NOTE — PLAN OF CARE
Problem: Adult Inpatient Plan of Care  Goal: Plan of Care Review  Description: The Plan of Care Review/Shift note should be completed every shift.  The Outcome Evaluation is a brief statement about your assessment that the patient is improving, declining, or no change.  This information will be displayed automatically on your shift  note.  Outcome: Progressing   Goal Outcome Evaluation:       Patient had IVC filter placed today, incision is clean, dry and intact. Patient tolerated it well. Patient is alert to self only and has confused conversation. Patient ate 100% for breakfast and dinner. He is more alert this evening.
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
## 2024-01-15 NOTE — PROGRESS NOTES
PRIMARY DIAGNOSIS: GENERALIZED WEAKNESS    OUTPATIENT/OBSERVATION GOALS TO BE MET BEFORE DISCHARGE  1. Orthostatic performed: No    2. Tolerating PO medications: Yes    3. Return to near baseline physical activity: No    4. Cleared for discharge by consultants (if involved): No    Discharge Planner Nurse   Safe discharge environment identified: No  Barriers to discharge: Yes       Entered by: Rj Leiva RN 01/15/2024 6:22 AM     Please review provider order for any additional goals.   Nurse to notify provider when observation goals have been met and patient is ready for discharge.

## 2024-01-15 NOTE — SEDATION DOCUMENTATION
Patient Name: Kaiden Lowery  Medical Record Number: [id number]  Today's Date: 1/15/2024    Procedure: IVC Filter Placement  Proceduralist: Dr. Barbour    Procedure Start: 1537  Procedure end: 1552      Report given to: Kalen ONTIVEROS RN P1      Other Notes: Pt arrived to IR room 1 from P1. Consent reviewed. Pt denies any questions or concerns regarding procedure. Pt positioned supine and monitored per protocol. Pt tolerated procedure without any noted complications. VSS on RA. Pt. Required no sedation due to baseline decreased level of conciousness. Pt transferred back to P1.

## 2024-01-15 NOTE — PROGRESS NOTES
PRIMARY DIAGNOSIS: GENERALIZED WEAKNESS    OUTPATIENT/OBSERVATION GOALS TO BE MET BEFORE DISCHARGE  1. Orthostatic performed: N/A    2. Tolerating PO medications: Yes    3. Return to near baseline physical activity: Yes    4. Cleared for discharge by consultants (if involved): N/A    Discharge Planner Nurse   Safe discharge environment identified: Yes  Barriers to discharge: No       Entered by: Kalen Lee RN 01/15/2024 1:19 PM     Please review provider order for any additional goals.   Nurse to notify provider when observation goals have been met and patient is ready for discharge.

## 2024-01-15 NOTE — PROCEDURES
IR Procedure Note    Physician: Munir Barbour MD    Procedure:  IVC filter placement    Findings/Plan:  Successful placement of a retrievable IVC filter.  Bedrest 2 hours.
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
## 2024-01-15 NOTE — PROGRESS NOTES
PRIMARY DIAGNOSIS: TRAUMA MANAGEMENT    OUTPATIENT/OBSERVATION GOALS TO BE MET BEFORE DISCHARGE    Trauma Closed Head Injury    1.  Diagnostic testing complete: Yes      2.  OT TBI Screen complete and consideration of outpatient treatment plan (if applicable): N/A      3.  Vital signs stable: Yes      4.  Adequate pain control on analgesics: Yes      5.  Return to near baseline physical activity: Yes  Discharge Planner Nurse   Safe discharge environment identified: No  Barriers to discharge: Yes       Entered by: Rj Leiva RN 01/15/2024 3:42 AM     Please review provider order for any additional goals.  Nurse to notify provider when observation goals have been met and patient is ready for discharge.

## 2024-01-15 NOTE — PROGRESS NOTES
PRIMARY DIAGNOSIS: GENERALIZED WEAKNESS    OUTPATIENT/OBSERVATION GOALS TO BE MET BEFORE DISCHARGE  1. Orthostatic performed: N/A    2. Tolerating PO medications: Yes    3. Return to near baseline physical activity: No    4. Cleared for discharge by consultants (if involved): No    Discharge Planner Nurse   Safe discharge environment identified: No  Barriers to discharge: Yes       Entered by: Kalen Lee RN 01/15/2024 9:38 AM     Please review provider order for any additional goals.   Nurse to notify provider when observation goals have been met and patient is ready for discharge.

## 2024-01-15 NOTE — PROGRESS NOTES
Care Management Discharge Note    Discharge Date: 01/15/2024       Discharge Disposition:  return to [facility]    Discharge Services:  return to memory care    Discharge DME:      Discharge Transportation: agency    Private pay costs discussed: transportation costs    Does the patient's insurance plan have a 3 day qualifying hospital stay waiver?  No    PAS Confirmation Code:  NA  Patient/family educated on Medicare website which has current facility and service quality ratings:  NA    Education Provided on the Discharge Plan:  yes  Persons Notified of Discharge Plans: daughter, Nunu and prachi at [facility]  Patient/Family in Agreement with the Plan: yes     Handoff Referral Completed: Yes    Additional Information:  Patient to return to [facility] by [facility] house at [phone]'      1400 Discharge canceled. Transport and [facility] notified.    NASIR Holt
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
## 2024-01-16 PROBLEM — S01.01XA SCALP LACERATION, INITIAL ENCOUNTER: Status: ACTIVE | Noted: 2024-01-01
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
## 2024-01-16 PROBLEM — S32.059A CLOSED FRACTURE OF FIFTH LUMBAR VERTEBRA, UNSPECIFIED FRACTURE MORPHOLOGY, INITIAL ENCOUNTER (H): Status: ACTIVE | Noted: 2024-01-01

## 2024-01-16 PROBLEM — Z86.59 HISTORY OF DEMENTIA: Status: ACTIVE | Noted: 2024-01-01

## 2024-01-16 PROBLEM — S09.90XA TRAUMATIC INJURY OF HEAD, INITIAL ENCOUNTER: Status: ACTIVE | Noted: 2024-01-01

## 2024-01-16 NOTE — PLAN OF CARE
Physical Therapy Discharge Summary    Reason for therapy discharge:    Discharged to back to memory care    Progress towards therapy goal(s). See goals on Care Plan in [facility] electronic health record for goal details.  Goals not met.  Barriers to achieving goals:   limited tolerance for therapy and discharge from facility.    Therapy recommendation(s):    Continued therapy is recommended.  Rationale/Recommendations:  as able.

## 2024-01-16 NOTE — PROGRESS NOTES
"PRIMARY DIAGNOSIS: \"GENERIC\" NURSING  OUTPATIENT/OBSERVATION GOALS TO BE MET BEFORE DISCHARGE:  ADLs back to baseline: Yes    Activity and level of assistance: Up with standby assistance.    Pain status: Pain free.    Return to near baseline physical activity: Yes     Discharge Planner Nurse   Safe discharge environment identified: Yes  Barriers to discharge: No       Entered by: Rj Leiva RN 01/16/2024 3:14 AM     Please review provider order for any additional goals.   Nurse to notify provider when observation goals have been met and patient is ready for discharge.   "
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
## 2024-01-16 NOTE — PROGRESS NOTES
Occupational Therapy Discharge Summary    Reason for therapy discharge:    Discharged to memory care    Progress towards therapy goal(s). See goals on Care Plan in [facility] electronic health record for goal details.  Goals partially met.  Barriers to achieving goals:   limited tolerance for therapy.    Therapy recommendation(s):    Pt would benefit from continued therapy for endurance building and safety at discharge.

## 2024-01-16 NOTE — PROGRESS NOTES
Care Management Discharge Note    Discharge Date: 01/16/2024       Discharge Disposition:  return to [facility]    Discharge Services:      Discharge DME:      Discharge Transportation: agency    Private pay costs discussed: transportation costs    Does the patient's insurance plan have a 3 day qualifying hospital stay waiver?  No    PAS Confirmation Code: NA   Patient/family educated on Medicare website which has current facility and service quality ratings:  NA    Education Provided on the Discharge Plan:  yes  Persons Notified of Discharge Plans: Too at  and daughterNunu  Patient/Family in Agreement with the Plan: yes     Handoff Referral Completed: Yes    Additional Information:   Return to [facility] [phone]. 1/16.    NASIR Holt

## 2024-01-16 NOTE — PLAN OF CARE
Goal Outcome Evaluation:    PRIMARY DIAGNOSIS: GENERALIZED WEAKNESS    OUTPATIENT/OBSERVATION GOALS TO BE MET BEFORE DISCHARGE  1. Orthostatic performed: N/A    2. Tolerating PO medications: Yes    3. Return to near baseline physical activity: Yes    4. Cleared for discharge by consultants (if involved): Yes    Discharge Planner Nurse   Safe discharge environment identified: Yes  Barriers to discharge: No       Entered by: Graham Pascal RN 01/16/2024 11:22 AM

## 2024-01-17 NOTE — PLAN OF CARE
Problem: Nausea and Vomiting  Goal: Nausea and Vomiting Relief  Outcome: Not Progressing   Goal Outcome Evaluation:     Pt had good appetite and ate all of food at 1725. Pt vomited it all out but denied continued nausea. Pt blankets changed and gown put on. Sweater is soiled and will be put in bag in closet.

## 2024-01-17 NOTE — PROGRESS NOTES
"   01/17/24 1400   Appointment Info   Signing Clinician's Name / Credentials (PT) Marielle Bowman,PT   Living Environment   People in Home facility resident   Current Living Arrangements other (see comments)  (memory care)   Self-Care   Equipment Currently Used at Home walker, rolling   Activity/Exercise/Self-Care Comment pt unable to answer questions, information from chart   General Information   Onset of Illness/Injury or Date of Surgery 01/16/24   Referring Physician Dr. Kaiden Clark   Patient/Family Therapy Goals Statement (PT) none stated   Pertinent History of Current Problem (include personal factors and/or comorbidities that impact the POC) Kaiedn Lowery is a 73 year old male admitted on 1/16/2024. He has a history of HTN, mood disorder, dementia, encephalopathy, PE, DVT, cognitive impairment, atrial fibrillation and is readmitted for another fall, now found to have acute sacral fracture.      Patient discharged earlier on same day, had been hospitalized for another fall and was being observed for subdural hematomas that remained stable on repeat. Plan was for patient to hold anticoagulation for two weeks until repeat CT performed per NSG. Given Afib and high risk for VTE recurrence, IVC filter placement by IR before discharge.   Existing Precautions/Restrictions other (see comments);fall  (1:1)   General Observations pt in ER bed, 1:1   Cognition   Affect/Mental Status (Cognition) confused   Follows Commands (Cognition) delayed response/completion;increased processing time needed   Pain Assessment   Patient Currently in Pain   (pt says \"yes\" to pain, unable to describe location of pain)   Range of Motion (ROM)   ROM Comment BLE in bed limited   Strength (Manual Muscle Testing)   Strength Comments pt able to lift BLE off bed briefly   Bed Mobility   Impairments Contributing to Impaired Bed Mobility decreased strength;pain;other (see comments)  (cognition)   Assistive Device (Bed Mobility) other " (see comments);draw sheet  (HOB elevated)   Comment, (Bed Mobility) multiple attempts to get pt up, pt declining, able to sit hafl way up, but pt laid himself back down, pt would statrt moving leg to EOB but then bring them back, declined OOB   Clinical Impression   Criteria for Skilled Therapeutic Intervention Yes, treatment indicated   PT Diagnosis (PT) decreased functional mobility   Influenced by the following impairments cognition. weakness, pain   Functional limitations due to impairments bed mob, sit<>stand, gait   Clinical Presentation (PT Evaluation Complexity) evolving   Clinical Presentation Rationale presents as medically diagnosed   Clinical Decision Making (Complexity) moderate complexity   Planned Therapy Interventions (PT) bed mobility training;gait training;transfer training;progressive activity/exercise   Risk & Benefits of therapy have been explained evaluation/treatment results reviewed;care plan/treatment goals reviewed;risks/benefits reviewed;patient;participants voiced agreement with care plan   PT Total Evaluation Time   PT Eval, Moderate Complexity Minutes (51775) 12   Physical Therapy Goals   PT Frequency Daily   PT Predicted Duration/Target Date for Goal Attainment 01/23/24   PT: Bed Mobility Minimal assist;Supine to/from sit   PT: Transfers Minimal assist;Sit to/from stand;Bed to/from chair;Assistive device   PT: Gait Minimal assist;Rolling walker;50 feet   PT Discharge Planning   PT Plan bed mob, transfers and gait w/ FWW   PT Discharge Recommendation (DC Rec) Long term care facility;other (see comments)  (memorycare facility)   PT Rationale for DC Rec pt need 24hr A with mobility for safety   PT Brief overview of current status unable to sit pt up at EOB, pt returning to supine and declining   PT Equipment Needed at Discharge walker, rolling;wheelchair   Total Session Time   Total Session Time (sum of timed and untimed services) 12

## 2024-01-17 NOTE — CONSULTS
Neurosurgery Consult    HPI    Mr. Lowery is a 73-year-old male who was initially seen by neurosurgery on 1/11/2024, after a fall at his memory care facility, and was found to have bilateral subdural fluid collections.  These were found to be stable on repeat imaging, patient was recommended to follow-up in 2 weeks with neurosurgery clinic, to hold any aspirin or anticoagulation in the interim.    He had another fall on 1/16/2024, and was readmitted to the hospital.    He was found to have a sacral fracture  Cervical CT scan demonstrated severe stenosis at C3-4.    Patient is not able to provide much history, but he does answer some questions and follow commands.    He was previously anticoagulated on Eliquis, this was stopped at his last admission, and an IVC filter was placed.          Medical history   HTN, mood disorder, dementia, encephalopathy, PE, DVT, cognitive impairment, atrial fibrillation    Social history  Lives in a memory care facility.      B/P: 122/87, T: 98, P: 89, R: 16       Exam    Alert, following commands, no acute distress  Bilateral upper extremities with 5/5 strength  Braden sign negative    Bilateral lower extremities with 5/5 strength  Reflexes absent patella and ankle  Negative straight leg raise bilaterally  Negative ankle clonus negative Babinski bilaterally      Imaging        IMPRESSION:  CERVICAL SPINE CT:  1.  No fracture or posttraumatic subluxation.  2.  Degenerative changes, as above, with up to severe central spinal canal stenosis at the level of C3-C4.     THORACIC SPINE CT:  1.  No fracture or posttraumatic subluxation.  2.  Additional findings above.     LUMBAR SPINE CT:  1.  Acute oblique fracture traversing the fifth sacral vertebral body.  2.  No acute lumbar spine fracture or posttraumatic subluxation.  3.  Additional findings above.      Head CT scan demonstrated  IMPRESSION:  1.  Grossly stable bilateral low-density extra-axial fluid collections, left greater than  right.  2.  No new hemorrhage. No acute infarct.  3.  Chronic senescent and presumed microvascular ischemic changes, as above.  Assessment    Stable bilateral low-density extra-axial fluid collections  Severe spinal stenosis at C3-4  Status post fall     atrial fibrillation, PE DVT, holding to anticoagulation, has IVC filter in place    Plan:      I called the patient's daughter, Nunu, and spoke with her, she states they would not be interested in any type of surgical intervention if spinal stenosis affecting the spinal cord were to be confirmed on MRI.  Therefore no further imaging at this time is needed.  If that decision changes, we would recommend a cervical MRI without contrast, and a flexion-extension x-ray.    For his subdural fluid collections, recommend repeat head CT scan in 2 weeks, and continue to hold anticoagulation at that time.  Our clinic will help arrange follow-up.    I explained all the above to the patient's daughter and she is agreeable with the plan.    Neurosurgery will sign off at this time.    Discussed with Dr. Wyman
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
## 2024-01-17 NOTE — ED NOTES
[facility] ED Handoff Report    ED Chief Complaint: Traumatic injury    ED Diagnosis:  (W19.XXXA) Fall, initial encounter    (S09.90XA) Traumatic injury of head, initial encounter    (S01.01XA) Scalp laceration, initial encounter    (S32.059A) Closed fracture of fifth lumbar vertebra, unspecified fracture morphology, initial encounter (H)    (Z86.59) History of dementia    (N30.00) Acute cystitis without hematuria     PMH:    Past Medical History:   Diagnosis Date    Hypertension     Obesity         Code Status:  Prior     Falls Risk: Yes Band: Applied    Current Living Situation/Residence: [facility]     Elimination Status: Continent: No, bowel and bladder program, and wears briefs Urge to void and able to use urinal, but also incontinent.    Activity Level: Total assist/lift    Patients Preferred Language:  English     Needed: No    Vital Signs:  BP (!) 149/82   Pulse 97   Temp 98  F (36.7  C) (Axillary)   Resp 23   SpO2 100%      Cardiac Rhythm: A. Fib, rate conrolled    Pain Score: Patient is unable to quantify.    Is the Patient Confused:  Yes    Focused Assessment:  Alert.  Oriented to self, which is baseline.  Able to converse but somewhat rambling and sometimes statements unrelated.  Laceration and bruising on forehead. Recently treated for a fall so also has old bruising on top of new injuries caused by unwitnessed fall today.  So far calm, sleeping between cares. Staff who are familiar with him report that he can be very restless.    Tests Performed: Done: Labs and Imaging    Treatments Provided:  Monitoring.  Pain management.  IV antibiotic.    Family Dynamics/Concerns: No    Medications sent with patient: N/A    Additional Information: For more information reach out to TOMMY Urena at [phone].  Takes care of patient at [facility]    RN: Ally Art RN 1/16/2024 10:53 PM

## 2024-01-17 NOTE — PHARMACY-ADMISSION MEDICATION HISTORY
Pharmacist Admission Medication History    Admission medication history is complete. The information provided in this note is only as accurate as the sources available at the time of the update.    Information Source(s): Hospital records via N/A    Pertinent Information: Patient was discharged this afternoon to a memory care facility.  A medication list was not sent over with the patient.  I am assuming no medication changes happened in the 4-6 hour window of discharge to readmit to ED.    Changes made to PTA medication list:  Added: None  Deleted: None  Changed: None         Allergies reviewed with patient and updates made in EHR: yes    Medication History Completed By: Chang Chaudhari [facility] 1/16/2024 7:37 PM    PTA Med List   Medication Sig Last Dose    cyanocobalamin (VITAMIN B-12) 1000 MCG tablet Take 1,000 mcg by mouth daily Unknown    digoxin (LANOXIN) 125 MCG tablet Take 125 mcg by mouth daily Unknown    gabapentin (NEURONTIN) 100 MG capsule Take 100 mg by mouth 3 times daily Unknown    melatonin 3 MG tablet Take 3 tablets by mouth nightly as needed Unknown    metoprolol tartrate (LOPRESSOR) 100 MG tablet Take 1 tablet (100 mg) by mouth 2 times daily Unknown    Multiple Vitamins-Minerals (CERTAVITE SENIOR) TABS Take 1 tablet by mouth daily Unknown    OLANZapine (ZYPREXA) 2.5 MG tablet Take 2.5 mg by mouth 2 times daily as needed Unknown    Thiamine Mononitrate (CYTO B1 PO) Take 1 tablet by mouth daily Unknown    traZODone (DESYREL) 100 MG tablet Take 200 mg by mouth at bedtime Unknown    traZODone (DESYREL) 50 MG tablet Take 25 mg by mouth 2 times daily as needed Unknown

## 2024-01-17 NOTE — ED NOTES
Bed: JNEDH-A  Expected date: 1/16/24  Expected time: 6:44 PM  Means of arrival: Ambulance  Comments:  [facility]  73 M  Fall
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
## 2024-01-17 NOTE — CONSULTS
Care Management Initial Consult    General Information  Assessment completed with: Patient, Children, Patient, daughter Nunu  Type of CM/SW Visit: Initial Assessment    Primary Care Provider verified and updated as needed: Yes   Readmission within the last 30 days: current reason for admission unrelated to previous admission      Reason for Consult: discharge planning  Advance Care Planning: Advance Care Planning Reviewed: other (see comments) (Verified with daughterNunu)        Communication Assessment  Patient's communication style: spoken language (English or Bilingual)       Cognitive  Cognitive/Neuro/Behavioral: .WDL except, orientation        Orientation: disoriented to, time, situation             Living Environment:   People in home: facility resident     Current living Arrangements: other (see comments) ([facility])  Name of Facility: [facility]   Able to return to prior arrangements: yes     Family/Social Support:  Care provided by: other (see comments) (Memory Care staff)  Provides care for: no one, unable/limited ability to care for self  Marital Status:   Children          Description of Support System: Supportive, Involved    Support Assessment: Adequate family and caregiver support    Current Resources:   Patient receiving home care services: No     Community Resources: None  Equipment currently used at home: walker, rolling  Supplies currently used at home: Incontinence Supplies    Employment/Financial:  Employment Status: retired        Financial Concerns: none   Referral to Financial Worker: No     Does the patient's insurance plan have a 3 day qualifying hospital stay waiver?  No    Lifestyle & Psychosocial Needs:  Social Determinants of Health     Food Insecurity: Not on file   Depression: Not on file   Housing Stability: Not on file   Tobacco Use: Medium Risk (1/16/2024)    Patient History     Smoking Tobacco Use: Former     Smokeless Tobacco Use: Unknown     Passive  Exposure: Not on file   Financial Resource Strain: Not on file   Alcohol Use: Not on file   Transportation Needs: Not on file   Physical Activity: Not on file   Interpersonal Safety: Not on file   Stress: Not on file   Social Connections: Not on file     Functional Status:  Prior to admission patient needed assistance:   Dependent ADLs:: Ambulation-walker, Bathing, Dressing, Grooming, Incontinence  Dependent IADLs:: Cleaning, Cooking, Laundry, Shopping, Meal Preparation, Medication Management, Transportation, Incontinence    Additional Information:  Writer met with patient at bedside to review role of care management services, discuss goals of care and assess need for any possible services at discharge. Patient appears confused, not answering questions appropriately, but engaged in the conversation. RNCM called patient's daughter, Nunu to assist with the assessment. Patient does not  have a HCD. Patient recently moved to live in a Memory Care at [facility] at the end of Dec 2023. Patient needs assist with ADLs and is dependent with IADLs. Patient has a walker, but forget to use. Patient has VA benefits. No community resources. Goal is to return to the Memory Care. Daughter prefer [employer] stretcher transport at discharge.    Discussed out of pocket cost of [employer] medical transportation by wheelchair with patient and or family member, Nunu. Patient/family member agreed with the plan to have transportation arranged by [employer] transport.         Arleen Barrera RN

## 2024-01-17 NOTE — CONSULTS
ORTHOPEDIC CONSULTATION    Consultation  Kaiden Calvert,  1950, MRN [id number]    History of dementia [Z86.59]    PCP: [place], [facility], None   Code status:  No CPR- Do NOT Intubate       Extended Emergency Contact Information  Primary Emergency Contact: DORENE VINES  Address: [address] [place] [address] [facility]  Mobile Phone: [phone]  Relation: Daughter  Secondary Emergency Contact: GUNNAR CALVERT  Address: [address] [place] [address] [place]  Mobile Phone: [phone]  Relation: Son         IMPRESSION:  Acute oblique S5 fracture     PLAN:  This patient was discussed with Dr. Curry, on-call surgeon for [facility] and they are in agreement with the following plan.   -No indication for urgent surgical intervention at this time.  The sacral fracture can be treated nonoperatively  -Weight-bear as tolerated and activity as tolerated with assistance as needed  -If there is pain with sitting may use a donut cushion as needed  -PT/OT for disposition recommendations  -Pain regimen per primary team  -Patient may follow-up with their primary care provider for continued management of the fracture.  We would recommend that they follow-up with a bone density specialist, may see Dr. Brady at [facility] for bone density evaluation    Ortho will sign off. Please feel free to reach out with any further questions or concerns.       Thank you for including [facility] in the care of Kaiden Calvert. It has been a pleasure participating in their care.        CHIEF COMPLAINT: History of dementia    HISTORY OF PRESENT ILLNESS:  The patient is seen in orthopedic consultation at the request of Kaiden Clark MD for sacral fracture.  The patient is a 73 year old male with PMH significant for severe dementia, weakness, recurrent falls.    Today patient is seen in the emergency department due to recurrent falls.  He was recently  admitted due to a fall at his memory care facility resulting in a head injury.  He was monitored and discharged however same day as discharge he had a another fall resulting in another head injury.  Patient is very confused at baseline and was unable to appropriately answer questions and provide accurate history for me.  I did call the facility that he lives at and spoke with the director of nursing who told me that he has not been complaining of lower back/sacral pain while at the facility.  He does not note any pain to me in this area at this time.  Denies any pain rating down his legs or numbness/tingling.  He has had a number of recent falls and is impulsive, tries to get up and ambulate without any assistance frequently.    ALLERGIES:   Review of patient's allergies indicates No Known Allergies      MEDICATIONS UPON ADMISSION:  Medications were reviewed.  They include:   (Not in a hospital admission)        SOCIAL HISTORY:   he  reports that he has quit smoking. He does not have any smokeless tobacco history on file.      FAMILY HISTORY:  family history is not on file.      REVIEW OF SYSTEMS:   Reviewed with patient. See HPI, otherwise negative       PHYSICAL EXAMINATION:  Vitals: /61   Pulse 89   Temp 98  F (36.7  C) (Axillary)   Resp 16   SpO2 93%   General: On examination, the patient is resting comfortably, NAD, awake, and alert but is not oriented to place or situation at this time  SKIN: There is no evidence of erythema, warmth, swelling, deformity, crepitus, break to the skin, open wound.  Pulses:  Dorsalis pedis pulse is intact and equal bilaterally  Sensation: intact and equal bilaterally to the distal lower extremities.  Tenderness: TTP of the sacrum. NTTP of the SI joints, lumbar spinous processes, pelvis or greater trochanters.  Motor:   LLE:   5/5 quad  5/5 hip flexors  5/5 gastroc  5/5 tib ant  5/5 ehl  RLE:   5/5 quads  5/5 hip flexors  5/5 gastroc  5/5 tib ant  5/5  [facility]      RADIOGRAPHIC EVALUATION:  Personally reviewed  EXAM: CT CERVICAL SPINE W/O CONTRAST, CT THORACIC SPINE W/O CONTRAST, CT LUMBAR SPINE W/O CONTRAST  LOCATION: [facility]  DATE: 1/16/2024     INDICATION: Fall. Traumatic neck and back injury.  COMPARISON: 01/11/2024.  TECHNIQUE:  1) Routine CT Cervical Spine without IV contrast. Multiplanar reformats. Dose reduction techniques were used.   2) Routine CT Thoracic Spine without IV contrast. Multiplanar reformats. Dose reduction techniques were used.   3) Routine CT Lumbar Spine without IV contrast. Multiplanar reformats. Dose reduction techniques were used.      FINDINGS:     CERVICAL SPINE CT:  VERTEBRA: Decreased osseous mineralization. Mild dextroconvex curvature of the cervical spine. No acute fracture or posttraumatic subluxation.      CANAL/FORAMINA: Multilevel degenerative changes. Prominent disc osteophyte complex at C3-C4 causes moderate to severe canal stenosis. Small disc osteophyte complexes are noted at the levels of C4-C5, C5-C6 and C6-C7 causing up to mild to moderate canal   stenosis. At C2-C3, there is moderate left neural foraminal stenosis. At C3-C4, there is severe bilateral neural foraminal stenosis. At C4-C5, there is mild left neural foraminal stenosis. At C5-C6, there is severe left and moderate right neural   foraminal stenosis. At C6-C7, there is severe bilateral neural foraminal stenosis. At C7-T1, there is mild bilateral neural foraminal stenosis.     PARASPINAL: Left apical scarring/fibrotic change.     THORACIC SPINE CT:  VERTEBRA: Decreased osseous mineralization. Normal vertebral body heights. No acute fracture or posttraumatic subluxation.       CANAL/FORAMINA: Mild multilevel degenerative changes. No high-grade central spinal canal stenosis. On the right at T11-T12, there is moderate neural foraminal stenosis. On the left at T8-T9, there is severe neural foraminal stenosis.     PARASPINAL: Scattered  indeterminate low-density cystic lesions identified involving the liver. Scattered coronary artery vascular calcifications. Scattered areas of atelectatic changes versus scarring noted involving the lungs.     LUMBAR SPINE CT:  VERTEBRA: Decreased osseous mineralization. Stable vertebral body heights. Stable compression deformities identified at the levels of L1 and L3. Incidentally noted IVC filter is seen. No acute lumbar spine fracture or post maxillofacial.      There is an oblique, acute fracture traversing the fifth sacral body with possible extension into the coccyx.     CANAL/FORAMINA: Mild multilevel degenerative changes. No high-grade central spinal canal stenosis. There is mild right neural foraminal stenosis at L5-S1. There is moderate to severe left neural foraminal stenosis at L5-S1.     PARASPINAL: Colonic fecal loading. Nonspecific mild distention of the bladder.                                                                       IMPRESSION:  CERVICAL SPINE CT:  1.  No fracture or posttraumatic subluxation.  2.  Degenerative changes, as above, with up to severe central spinal canal stenosis at the level of C3-C4.     THORACIC SPINE CT:  1.  No fracture or posttraumatic subluxation.  2.  Additional findings above.     LUMBAR SPINE CT:  1.  Acute oblique fracture traversing the fifth sacral vertebral body.  2.  No acute lumbar spine fracture or posttraumatic subluxation.  3.  Additional findings above.    PERTINENT LABS:  Personally reviewed  Recent Labs   Lab Test 01/17/24  0731 01/16/24  1902   INR  --  1.06   HGB 13.4 14.6    193         FESTUS LOPEZ PA-C  Date: 1/17/2024  Time: 10:59 AM  [facility]    CC1:   Kaiden Clark MD

## 2024-01-17 NOTE — ED TRIAGE NOTES
EMS Report:  74 y/o M from memory care.  Unwitnessed fall.  No reliable historians present at time.  Hx of dementia, orientation and responsiveness at baseline.  Patient reported tripping.  Discharged from [facility] yesterday for fall.  Mid-upper forehead laceration.  Bruise to R side of forehead.     Triage Assessment (Adult)       Row Name 01/16/24 3269          Triage Assessment    Airway WDL WDL        Respiratory WDL    Respiratory WDL WDL        Skin Circulation/Temperature WDL    Skin Circulation/Temperature WDL X        Cardiac WDL    Cardiac WDL WDL        Cognitive/Neuro/Behavioral WDL    Cognitive/Neuro/Behavioral WDL X;orientation     Orientation disoriented to;place;time;situation

## 2024-01-17 NOTE — PLAN OF CARE
"Goal Outcome Evaluation:      Problem: Adult Inpatient Plan of Care  Goal: Patient-Specific Goal (Individualized)  Description: You can add care plan individualizations to a care plan. Examples of Individualization might be:  \"Parent requests to be called daily at 9am for status\", \"I have a hard time hearing out of my right ear\", or \"Do not touch me to wake me up as it startles  me\".  Outcome: Progressing     Problem: Adult Inpatient Plan of Care  Goal: Optimal Comfort and Wellbeing  Outcome: Progressing     Problem: Risk for Delirium  Goal: Improved Behavioral Control  Outcome: Progressing     Problem: Risk for Delirium  Goal: Improved Attention and Thought Clarity  Outcome: Progressing    A/o X2. Vital signs stable. Scheduled Tylenol was effective for headache per pt. Incontinent of urine. No behaviors. Slept for most of the day. Ate 100% of his breakfast and 50% of his lunch. Assist of one with cares. Able to walk to bathroom with walker and gait belt. Transferred to P2 at 1445. Had medium emesis x1 at 1500.                            "

## 2024-01-17 NOTE — PROGRESS NOTES
Contacted regarding 72 yo male presenting from [facility] care after unwitnessed fall.     Imaging reveals sacral fracture and cervical stenosis in on CT.     Imaging:    IMPRESSION:  CERVICAL SPINE CT:  1.  No fracture or posttraumatic subluxation.  2.  Degenerative changes, as above, with up to severe central spinal canal stenosis at the level of C3-C4.    LUMBAR SPINE CT:  1.  Acute oblique fracture traversing the fifth sacral vertebral body.  2.  No acute lumbar spine fracture or posttraumatic subluxation.  3.  Additional findings above.    RECOMMENDATIONS:  NS will see in consultation tomorrow.  Cervical spinal stenosis would need to be further evaluated with MRI.  Sacral fracture recommend orthopedic consult.    EULA Ross  [facility]  [address] [address]    Tel [phone]  Pager [phone]

## 2024-01-18 NOTE — PLAN OF CARE
Problem: Risk for Delirium  Goal: Improved Behavioral Control  Intervention: Minimize Safety Risk  Recent Flowsheet Documentation  Taken 1/18/2024 0800 by Jory Kinney RN  Communication Enhancement Strategies:   call light answered in person   extra time allowed for response  Enhanced Safety Measures:   pain management    at bedside   Goal Outcome Evaluation:       Pt has been on one to one nursing cares. Does not use the call light to ask for assistance. Is very unsteady when up out of bed. Pt has denied pain or discomfort.
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
## 2024-01-18 NOTE — PLAN OF CARE
Occupational Therapy: Orders received and chart reviewed, discussed with care team. Per chart review, pt's family has decided to pursue hospice at [facility]. No skilled occupational therapy needs at this time, will complete orders and defer to care team for discharge recommendations.    Lyn Prescott, OTR/L 1/18/24

## 2024-01-18 NOTE — PLAN OF CARE
Physical Therapy Discharge Summary    Reason for therapy discharge:    Plan for discharge  is to return to memory care with hospice     Progress towards therapy goal(s). See goals on Care Plan in Epic electronic health record for goal details.  Goals not met     Therapy recommendation(s):    No further therapy is recommended.Pt returning to memory care with hospice

## 2024-01-18 NOTE — PLAN OF CARE
Problem: Adult Inpatient Plan of Care  Goal: Absence of Hospital-Acquired Illness or Injury  Outcome: Progressing  Intervention: Identify and Manage Fall Risk  Recent Flowsheet Documentation  Taken 1/17/2024 1600 by Meme Curiel, RN  Safety Promotion/Fall Prevention:   activity supervised   safety round/check completed   bedside attendant  Goal: Optimal Comfort and Wellbeing  Outcome: Progressing     Problem: Risk for Delirium  Goal: Optimal Coping  Outcome: Progressing  Goal: Improved Attention and Thought Clarity  Outcome: Progressing  Goal: Improved Sleep  Outcome: Progressing     Problem: Nausea and Vomiting  Goal: Nausea and Vomiting Relief  1/17/2024 2356 by Meme Curiel, RN  Outcome: Progressing  1/17/2024 1728 by Meme Curiel, RN  Outcome: Not Progressing   Goal Outcome Evaluation:      Pt is A&Ox1 to self. Intermittent confusion. Two bouts of emesis on evening shift. No nausea afterwards. Soiled sweater so Pt is in a gown. Was provided warm blankets and icecream as well as jello. Good appetite before vomiting it all out. Took all meds with no issue. Cooperative for shift.

## 2024-01-18 NOTE — PLAN OF CARE
Problem: Adult Inpatient Plan of Care  Goal: Plan of Care Review  Description: The Plan of Care Review/Shift note should be completed every shift.  The Outcome Evaluation is a brief statement about your assessment that the patient is improving, declining, or no change.  This information will be displayed automatically on your shift  note.  Outcome: Progressing     Problem: Adult Inpatient Plan of Care  Goal: Absence of Hospital-Acquired Illness or Injury  Intervention: Identify and Manage Fall Risk  Recent Flowsheet Documentation  Taken 1/17/2024 2358 by Alber Edwards RN  Safety Promotion/Fall Prevention:   activity supervised   safety round/check completed   bedside attendant  Intervention: Prevent Skin Injury  Recent Flowsheet Documentation  Taken 1/17/2024 2358 by Alber Edwards RN  Body Position: position changed independently  Intervention: Prevent Infection  Recent Flowsheet Documentation  Taken 1/17/2024 2358 by Alber Edwards RN  Infection Prevention:   hand hygiene promoted   single patient room provided     Problem: Risk for Delirium  Goal: Improved Behavioral Control  Intervention: Minimize Safety Risk  Recent Flowsheet Documentation  Taken 1/17/2024 2358 by Alber Edwards RN  Communication Enhancement Strategies:   call light answered in person   extra time allowed for response  Enhanced Safety Measures:  at bedside     Problem: Suicide Risk  Goal: Absence of Self-Harm  Intervention: Assess Risk to Self and Maintain Safety  Recent Flowsheet Documentation  Taken 1/17/2024 2358 by Alber Edwards RN  Enhanced Safety Measures:  at bedside   Goal Outcome Evaluation:       Pt disoriented to place, time, and situation. Denies pain, nausea or vomit. Impulsive. Unsteady gait. Slept all night except when using bathroom. No event. 1:1 sitter.

## 2024-01-18 NOTE — PROGRESS NOTES
"Care Management Follow Up    Length of Stay (days): 2    Expected Discharge Date: 01/19/2024     Concerns to be Addressed: discharge planning     Patient plan of care discussed at interdisciplinary rounds: Yes    Anticipated Discharge Disposition:  Long term care     Anticipated Discharge Services:  Long term care  Anticipated Discharge DME:  NA    Patient/family educated on Medicare website which has current facility and service quality ratings:  NA  Education Provided on the Discharge Plan:  Yes per team  Patient/Family in Agreement with the Plan:  NA    Referrals Placed by CM/SW:  NA  Private pay costs discussed: transportation costs    Additional Information:  0810 Called [facility] [phone] and spoke with Ayanna LIMON. She would like to come and assess patient when he is ready to see if patient is appropriate to return to the Memory Care, because patient has had a lot of falls. Would like to talk with family regarding the Goal of Care. Does family want comfort care?  No long term care at [facility]     0815 called [phone] and spoke with Alisa to report inpatient stay.   [phone]    Social Hx: \"Live at [facility] [phone]. Needs assist with ADLs and is dependent with IADLs. Has a walker, but forget to use. Has VA benefits. No community resources. Goal is to return to the Memory Care. Daughter prefer [facility] transport at discharge.\"    RNCM to follow for medical progression, recommendations, and final discharge plan.     Arleen Barrera, RN     1000 Laurel [phone], intake nurse from [facility] called. Ayanna spoke with her and wants to make a possible referral after hospital discharge. Laurel has not spoke with the daughter, Nunu, yet. Wants to know discharge plan.    1008 per provider, Dr. ILENE Rizo, patient medically ready for discharge if has safe discharge plan.     Referral sent to [facility]    1053 Ayanna called and " "said she reached out to [facility] for a possible referral. Patient able to return to [facility] with hospice, but if no hospice, then family might need to find placement elsewhere. [facility] will not be able to provide the cares.    1129 rec'd message from provider, \"We can pursue hospice and see if he can go back to memory care.\"     Called [facility] and left a message for Laurel to return call  Called Ayanna at [facility]. She states she prefer discharge tomorrow to allow time for hospice to order all the needed equipments.     Sent message to update provider.    Laurel returned call and prefer early afternoon transport    [facility] stretcher transport 1:15-2:00 PM    Called to update Ayanna, patient's daughter, Nunu and Laurel w/[facility].  Laurel requested a couple days of comfort medications at discharge    Sent message to provider    "

## 2024-01-19 NOTE — PLAN OF CARE
Goal Outcome Evaluation:      Plan of Care Reviewed With: patient    Overall Patient Progress: no change     Patient alert to self only, confused. 1:1 for safety. Ate 100% of dinner. Incontinent of urine. Tired most of shift. Will continue to monitor.     Deepa Carballo RN 1/18/2024 10:33 PM

## 2024-01-19 NOTE — PLAN OF CARE
Problem: Adult Inpatient Plan of Care  Goal: Optimal Comfort and Wellbeing  Outcome: Progressing  Intervention: Monitor Pain and Promote Comfort  Recent Flowsheet Documentation  Taken 1/18/2024 2993 by Azucena Castro, RN  Pain Management Interventions: medication (see MAR)   Goal Outcome Evaluation:       Pt is alert to self only. Incontinent of bladder. A-1 with transfers. Scheduled tylenol given for discomfort. 1:1 sitter at bedside for safety. Uneventful shift. Plan to discharge back to memory care with hospice.  Pt has been resting inbetween cares.  Azucena Castro, RN

## 2024-01-19 NOTE — PLAN OF CARE
Shift from 0700 to 1930-    Problem: Adult Inpatient Plan of Care  Goal: Plan of Care Review  Description: The Plan of Care Review/Shift note should be completed every shift.  The Outcome Evaluation is a brief statement about your assessment that the patient is improving, declining, or no change.  This information will be displayed automatically on your shift  note.  Outcome: Met    Goal Outcome Evaluation:    Patient discharging to memory care soon.   Confused except to person. Pleasant.Sitter 1:1.    Ride is stretcher.    Didn't want to leave; Confused; given prn meds. Patient calm and alert when put on stretcher.

## 2024-01-19 NOTE — PROGRESS NOTES
"Care Management Follow Up    Length of Stay (days): 3    Expected Discharge Date: 01/19/2024     Concerns to be Addressed: discharge planning     Patient plan of care discussed at interdisciplinary rounds: Yes    Anticipated Discharge Disposition:  [facility] w/[facility] services     Anticipated Discharge Services:  [facility] w/[facility] services  Anticipated Discharge DME:  NA    Patient/family educated on Medicare website which has current facility and service quality ratings:  NA  Education Provided on the Discharge Plan:  Yes per team  Patient/Family in Agreement with the Plan:  NA    Referrals Placed by CM/SW:  NA  Private pay costs discussed: transportation costs - [facility] stretcher transport 1:15-2:00 PM    Additional Information:  PCS form completed for stretcher transport.    Called [facility] [phone] and left a message for Ayanna  Called [facility] [phone] and faxed discharge orders by [employer]  Called patient's daughter, Nunu, [phone]    Social Hx: \"Live at [facility] - Memory Care [phone]. Needs assist with ADLs and is dependent with IADLs. Has a walker, but forget to use. Has VA benefits. No community resources. Goal is to return to the Memory Care. Daughter prefer  [facility] stretcher transport at discharge.\"    RNCM to follow for medical progression, recommendations, and final discharge plan.     Arleen Barrera RN     "
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
## 2024-01-19 NOTE — PROGRESS NOTES
Care Management Discharge Note    Discharge Date: 01/19/2024       Discharge Disposition: Home, Hospice, Other (Comments) (Memory Care) - [facility] services     Discharge Services: Transportation Services, [facility] services     Discharge DME: None    Discharge Transportation:  [facility] stretcher transport 1:15-2:00 PM     Private pay costs discussed: transportation costs    Does the patient's insurance plan have a 3 day qualifying hospital stay waiver?  No    PAS Confirmation Code:  NA  Patient/family educated on Medicare website which has current facility and service quality ratings: yes    Education Provided on the Discharge Plan: Yes per team  Persons Notified of Discharge Plans: Patient per team  Patient/Family in Agreement with the Plan: yes    Handoff Referral Completed: Yes    Additional Information:  Patient discharge to [facility] services via [facility] stretcher transport 1:15-2:00 PM.    Informed Ada LOPEZ and bedside nurse, Ileana.     Arleen Barrera RN     EMS called and can  patient sooner 12:30 - 12:45 PM   Notified [facility] and [facility]

## 2024-01-31 NOTE — UTILIZATION REVIEW
Concurrent stay review; Secondary Review Determination - [facility]        Under the authority of the Utilization Management Committee, the utilization review process indicated a secondary review on the above patient.  The review outcome is based on review of the medical records, discussions with staff, and applying clinical experience noted on the date of the review.        (x) Observation/outpatient Status Appropriate - Concurrent stay review       RATIONALE FOR DETERMINATION:     Patient delayed discharge is related to disposition, there is no medical necessity for inpatient admission at the time of this review. If there is a change in patient status, please resend for review.    The information on this document is developed by the utilization review team in order for the business office to ensure compliance.  This only denotes the appropriateness of proper admission status and does not reflect the quality of care rendered.       The definitions of Inpatient Status and Observation Status used in making the determination above are those provided in the CMS Coverage Manual, Chapter 1 and Chapter 6, section 70.4.       Sincerely,    Nunu Mclaughlin MD

## 2025-04-09 NOTE — PLAN OF CARE
"PRIMARY DIAGNOSIS: \"GENERIC\" NURSING  OUTPATIENT/OBSERVATION GOALS TO BE MET BEFORE DISCHARGE:  ADLs back to baseline: No    Activity and level of assistance: bedrest order    Pain status: Pain free.    Return to near baseline physical activity: No     Discharge Planner Nurse   Safe discharge environment identified: No  Barriers to discharge: Yes       Entered by: Lali Zeng RN 01/13/2024 11:00 AM     Please review provider order for any additional goals.   Nurse to notify provider when observation goals have been met and patient is ready for discharge.Goal Outcome Evaluation:                        " none

## (undated) RX ORDER — LIDOCAINE HYDROCHLORIDE 10 MG/ML
INJECTION, SOLUTION INFILTRATION; PERINEURAL
Status: DISPENSED
Start: 2024-01-01

## (undated) RX ORDER — FENTANYL CITRATE 50 UG/ML
INJECTION, SOLUTION INTRAMUSCULAR; INTRAVENOUS
Status: DISPENSED
Start: 2024-01-01